# Patient Record
Sex: FEMALE | Race: WHITE | HISPANIC OR LATINO | Employment: FULL TIME | URBAN - METROPOLITAN AREA
[De-identification: names, ages, dates, MRNs, and addresses within clinical notes are randomized per-mention and may not be internally consistent; named-entity substitution may affect disease eponyms.]

---

## 2017-11-27 DIAGNOSIS — Z01.812 PRE-OPERATIVE LABORATORY EXAMINATION: ICD-10-CM

## 2017-11-27 LAB
ANION GAP SERPL CALC-SCNC: 7 MMOL/L (ref 0–11.9)
B-HCG SERPL-ACNC: <0.6 MIU/ML (ref 0–5)
BASOPHILS # BLD AUTO: 0.7 % (ref 0–1.8)
BASOPHILS # BLD: 0.04 K/UL (ref 0–0.12)
BUN SERPL-MCNC: 11 MG/DL (ref 8–22)
CALCIUM SERPL-MCNC: 8.8 MG/DL (ref 8.5–10.5)
CHLORIDE SERPL-SCNC: 105 MMOL/L (ref 96–112)
CO2 SERPL-SCNC: 25 MMOL/L (ref 20–33)
CREAT SERPL-MCNC: 0.62 MG/DL (ref 0.5–1.4)
EOSINOPHIL # BLD AUTO: 0.04 K/UL (ref 0–0.51)
EOSINOPHIL NFR BLD: 0.7 % (ref 0–6.9)
ERYTHROCYTE [DISTWIDTH] IN BLOOD BY AUTOMATED COUNT: 47.9 FL (ref 35.9–50)
GFR SERPL CREATININE-BSD FRML MDRD: >60 ML/MIN/1.73 M 2
GLUCOSE SERPL-MCNC: 90 MG/DL (ref 65–99)
HCT VFR BLD AUTO: 41.7 % (ref 37–47)
HGB BLD-MCNC: 13.9 G/DL (ref 12–16)
IMM GRANULOCYTES # BLD AUTO: 0.01 K/UL (ref 0–0.11)
IMM GRANULOCYTES NFR BLD AUTO: 0.2 % (ref 0–0.9)
LYMPHOCYTES # BLD AUTO: 2.59 K/UL (ref 1–4.8)
LYMPHOCYTES NFR BLD: 43.8 % (ref 22–41)
MCH RBC QN AUTO: 31.3 PG (ref 27–33)
MCHC RBC AUTO-ENTMCNC: 33.3 G/DL (ref 33.6–35)
MCV RBC AUTO: 93.9 FL (ref 81.4–97.8)
MONOCYTES # BLD AUTO: 0.43 K/UL (ref 0–0.85)
MONOCYTES NFR BLD AUTO: 7.3 % (ref 0–13.4)
NEUTROPHILS # BLD AUTO: 2.81 K/UL (ref 2–7.15)
NEUTROPHILS NFR BLD: 47.3 % (ref 44–72)
NRBC # BLD AUTO: 0 K/UL
NRBC BLD AUTO-RTO: 0 /100 WBC
PLATELET # BLD AUTO: 281 K/UL (ref 164–446)
PMV BLD AUTO: 10.3 FL (ref 9–12.9)
POTASSIUM SERPL-SCNC: 3.5 MMOL/L (ref 3.6–5.5)
RBC # BLD AUTO: 4.44 M/UL (ref 4.2–5.4)
SODIUM SERPL-SCNC: 137 MMOL/L (ref 135–145)
WBC # BLD AUTO: 5.9 K/UL (ref 4.8–10.8)

## 2017-11-27 PROCEDURE — 36415 COLL VENOUS BLD VENIPUNCTURE: CPT

## 2017-11-27 PROCEDURE — 85025 COMPLETE CBC W/AUTO DIFF WBC: CPT

## 2017-11-27 PROCEDURE — 84702 CHORIONIC GONADOTROPIN TEST: CPT

## 2017-11-27 PROCEDURE — 80048 BASIC METABOLIC PNL TOTAL CA: CPT

## 2017-12-10 ENCOUNTER — HOSPITAL ENCOUNTER (OUTPATIENT)
Facility: MEDICAL CENTER | Age: 27
End: 2017-12-10
Attending: SPECIALIST | Admitting: SPECIALIST
Payer: MEDICAID

## 2017-12-10 VITALS
HEIGHT: 67 IN | WEIGHT: 174.82 LBS | TEMPERATURE: 98.6 F | OXYGEN SATURATION: 99 % | DIASTOLIC BLOOD PRESSURE: 46 MMHG | HEART RATE: 71 BPM | RESPIRATION RATE: 19 BRPM | SYSTOLIC BLOOD PRESSURE: 98 MMHG | BODY MASS INDEX: 27.44 KG/M2

## 2017-12-10 LAB
HCG UR QL: NEGATIVE
SP GR UR REFRACTOMETRY: 1.03

## 2017-12-10 PROCEDURE — 160048 HCHG OR STATISTICAL LEVEL 1-5: Performed by: SPECIALIST

## 2017-12-10 PROCEDURE — A9270 NON-COVERED ITEM OR SERVICE: HCPCS

## 2017-12-10 PROCEDURE — 160046 HCHG PACU - 1ST 60 MINS PHASE II: Performed by: SPECIALIST

## 2017-12-10 PROCEDURE — 500854 HCHG NEEDLE, INSUFFLATION FOR STEP: Performed by: SPECIALIST

## 2017-12-10 PROCEDURE — 160029 HCHG SURGERY MINUTES - 1ST 30 MINS LEVEL 4: Performed by: SPECIALIST

## 2017-12-10 PROCEDURE — A9270 NON-COVERED ITEM OR SERVICE: HCPCS | Performed by: SPECIALIST

## 2017-12-10 PROCEDURE — 160025 RECOVERY II MINUTES (STATS): Performed by: SPECIALIST

## 2017-12-10 PROCEDURE — 700111 HCHG RX REV CODE 636 W/ 250 OVERRIDE (IP)

## 2017-12-10 PROCEDURE — 700102 HCHG RX REV CODE 250 W/ 637 OVERRIDE(OP)

## 2017-12-10 PROCEDURE — 160041 HCHG SURGERY MINUTES - EA ADDL 1 MIN LEVEL 4: Performed by: SPECIALIST

## 2017-12-10 PROCEDURE — 160035 HCHG PACU - 1ST 60 MINS PHASE I: Performed by: SPECIALIST

## 2017-12-10 PROCEDURE — 160002 HCHG RECOVERY MINUTES (STAT): Performed by: SPECIALIST

## 2017-12-10 PROCEDURE — 160009 HCHG ANES TIME/MIN: Performed by: SPECIALIST

## 2017-12-10 PROCEDURE — 81025 URINE PREGNANCY TEST: CPT

## 2017-12-10 PROCEDURE — 501838 HCHG SUTURE GENERAL: Performed by: SPECIALIST

## 2017-12-10 PROCEDURE — 501579 HCHG TROCAR, STEP 5MM: Performed by: SPECIALIST

## 2017-12-10 PROCEDURE — 500577 HCHG FORCEP, BIPO CUT (EVEREST): Performed by: SPECIALIST

## 2017-12-10 PROCEDURE — 500886 HCHG PACK, LAPAROSCOPY: Performed by: SPECIALIST

## 2017-12-10 PROCEDURE — 700102 HCHG RX REV CODE 250 W/ 637 OVERRIDE(OP): Performed by: SPECIALIST

## 2017-12-10 PROCEDURE — 501399 HCHG SPECIMAN BAG, ENDO CATC: Performed by: SPECIALIST

## 2017-12-10 PROCEDURE — 160047 HCHG PACU  - EA ADDL 30 MINS PHASE II: Performed by: SPECIALIST

## 2017-12-10 RX ORDER — ONDANSETRON 2 MG/ML
4 INJECTION INTRAMUSCULAR; INTRAVENOUS EVERY 6 HOURS PRN
Status: DISCONTINUED | OUTPATIENT
Start: 2017-12-10 | End: 2017-12-10 | Stop reason: HOSPADM

## 2017-12-10 RX ORDER — OXYCODONE HYDROCHLORIDE AND ACETAMINOPHEN 5; 325 MG/1; MG/1
1 TABLET ORAL EVERY 6 HOURS PRN
Qty: 28 TAB | Refills: 0 | Status: SHIPPED | OUTPATIENT
Start: 2017-12-10 | End: 2020-03-25

## 2017-12-10 RX ORDER — IBUPROFEN 200 MG
800 TABLET ORAL EVERY 6 HOURS PRN
Status: DISCONTINUED | OUTPATIENT
Start: 2017-12-10 | End: 2017-12-10 | Stop reason: HOSPADM

## 2017-12-10 RX ORDER — OXYCODONE HCL 5 MG/5 ML
SOLUTION, ORAL ORAL
Status: COMPLETED
Start: 2017-12-10 | End: 2017-12-10

## 2017-12-10 RX ADMIN — IBUPROFEN 800 MG: 200 TABLET, FILM COATED ORAL at 13:20

## 2017-12-10 RX ADMIN — OXYCODONE HYDROCHLORIDE 10 MG: 5 SOLUTION ORAL at 11:53

## 2017-12-10 ASSESSMENT — PAIN SCALES - GENERAL
PAINLEVEL_OUTOF10: 0

## 2017-12-10 NOTE — DISCHARGE INSTRUCTIONS
ACTIVITY: Rest and take it easy for the first 24 hours.  A responsible adult is recommended to remain with you during that time.  It is normal to feel sleepy.  We encourage you to not do anything that requires balance, judgment or coordination.    MILD FLU-LIKE SYMPTOMS ARE NORMAL. YOU MAY EXPERIENCE GENERALIZED MUSCLE ACHES, THROAT IRRITATION, HEADACHE AND/OR SOME NAUSEA.    FOR 24 HOURS DO NOT:  Drive, operate machinery or run household appliances.  Drink beer or alcoholic beverages.   Make important decisions or sign legal documents.    SPECIAL INSTRUCTIONS: FOLLOW INSTRUCTIONS GIVEN TO YOU BY YOUR PHYSICIAN. SCHEDULE L A FOLLOW UP APPOINTMENT FOR 2 WEEK, IF YOU HAVE NOT ALREADY HAVE AN APPOINTMENT. ADVANCE YOUR DIET SLOWLY AS TOLERATED. DRINK LOTS OF LIQUID TO AVOID DEHYDRATION. AVOID ANY HEAVY PUSHING, PULLING OR LIFTING UNTIL CLEARED BY YOUR PHYSICIAN.     DIET: To avoid nausea, slowly advance diet as tolerated, avoiding spicy or greasy foods for the first day.  Add more substantial food to your diet according to your physician's instructions.  Babies can be fed formula or breast milk as soon as they are hungry.  INCREASE FLUIDS AND FIBER TO AVOID CONSTIPATION.    SURGICAL DRESSING/BATHING: YOU MAY SHOWER TOMORROW WHEN YOU ARE FEELING MORE STABLE.     FOLLOW-UP APPOINTMENT:  A follow-up appointment should be arranged with your doctor; call to schedule.    You should CALL YOUR PHYSICIAN if you develop:  Fever greater than 101 degrees F.  Pain not relieved by medication, or persistent nausea or vomiting.  Excessive bleeding (blood soaking through dressing) or unexpected drainage from the wound.  Extreme redness or swelling around the incision site, drainage of pus or foul smelling drainage.  Inability to urinate or empty your bladder within 8 hours.  Problems with breathing or chest pain.    You should call 911 if you develop problems with breathing or chest pain.  If you are unable to contact your doctor or  surgical center, you should go to the nearest emergency room or urgent care center.  Physician's telephone #: [phone]    If any questions arise, call your doctor.  If your doctor is not available, please feel free to call the Surgical Center at [phone].  The Center is open Monday through Friday from 7AM to 7PM.  You can also call the HEALTH HOTLINE open 24 hours/day, 7 days/week and speak to a nurse at [phone], or toll free at [phone].    A registered nurse may call you a few days after your surgery to see how you are doing after your procedure.    MEDICATIONS: Resume taking daily medication.  Take prescribed pain medication with food.  If no medication is prescribed, you may take non-aspirin pain medication if needed.  PAIN MEDICATION CAN BE VERY CONSTIPATING.  Take a stool softener or laxative such as senokot, pericolace, or milk of magnesia if needed.    Prescription given FOR PERCOCET .  Last pain medication given at 4062.    If your physician has prescribed pain medication that includes Acetaminophen (Tylenol), do not take additional Acetaminophen (Tylenol) while taking the prescribed medication.    Depression / Suicide Risk    As you are discharged from this [facility] facility, it is important to learn how to keep safe from harming yourself.    Recognize the warning signs:  · Abrupt changes in personality, positive or negative- including increase in energy   · Giving away possessions  · Change in eating patterns- significant weight changes-  positive or negative  · Change in sleeping patterns- unable to sleep or sleeping all the time   · Unwillingness or inability to communicate  · Depression  · Unusual sadness, discouragement and loneliness  · Talk of wanting to die  · Neglect of personal appearance   · Rebelliousness- reckless behavior  · Withdrawal from people/activities they love  · Confusion- inability to concentrate     If you or a loved one observes any of these behaviors or  has concerns about self-harm, here's what you can do:  · Talk about it- your feelings and reasons for harming yourself  · Remove any means that you might use to hurt yourself (examples: pills, rope, extension cords, firearm)  · Get professional help from the community (Mental Health, Substance Abuse, psychological counseling)  · Do not be alone:Call your Safe Contact- someone whom you trust who will be there for you.  · Call your local CRISIS HOTLINE 257-9590 or 895-674-5773  · Call your local Children's Mobile Crisis Response Team [place] (561) 841-0285 or www.[employer]  · Call the toll free National Suicide Prevention Hotlines   · National Suicide Prevention Lifeline 131-402-LERM (2774)  · National Hope Line Network 800-SUICIDE (716-5546)

## 2017-12-10 NOTE — OR SURGEON
Immediate Post OP Note    PreOp Diagnosis:   1.)  Menorrhagia  2.)  Dysmenorrhea  3.)  Malposition of the uterus (bimanual exam reveals that the uterus is retroverted).  4.)  Dyspareunia.  5.)  The patient desires permanent tubal sterilization in the form of a laparoscopic bilateral tubal ligation.    PostOp Diagnosis:   1.)  Menorrhagia  2.)  Dysmenorrhea  3.)  Malposition of the uterus (bimanual exam reveals that the uterus is retroverted).  4.)  Dyspareunia.  5.)  The patient desires permanent tubal sterilization in the form of a laparoscopic bilateral tubal ligation.    Procedure(s):  HYSTEROSCOPY THERMAL ABLATION/ ENDOMETRIAL - Wound Class: Clean Contaminated  PELVISCOPY- DIAGNOSTIC - Wound Class: Clean  TUBAL COAGULATION LAPAROSCOPIC BILATERAL - Wound Class: Clean    Surgeon(s):  Russ Conteh M.D.    Anesthesiologist/Type of Anesthesia:  Anesthesiologist: Aisha Larson M.D./General    Surgical Staff:  Circulator: Skip Solorio R.N.; Shaun Mayorga R.N.  Scrub Person: Parth Augustin    Specimens:  None    Estimated Blood Loss:   Less than 20 cc's     Findings:   The uterus is very retroverted.  At laparoscopy both fallopian tubes are found to be fairly short but otherwise normal. The uterus (other than for being retroverted) is normal.  At hysteroscopy excellent views of the intrauterine cavity are obtained.   During hysteroscopy no evidence of any congenital uterine anomaly is seen and no evidence of any and immature polyp is seen and no evidence of any submucosal fibroid is seen.   Both tubal ostia are seen.   When hysteroscopy is continued following hydrothermal endometrial ablation the entire intrauterine cavity is found to be nicely and thoroughly ablated.      Complications:   None        12/10/2017 11:19 AM Russ Conteh

## 2017-12-10 NOTE — OP REPORT
DATE OF SERVICE:  12/10/2017    PREOPERATIVE DIAGNOSES:  1.  Menorrhagia.  2.  Dysmenorrhea.  3.  Malposition of the uterus (the uterus is retroverted).  4.  Dyspareunia.  5.  The patient desires permanent tubal sterilization in the form of   laparoscopic bilateral tubal ligation.    POSTOPERATIVE DIAGNOSES:  1.  Menorrhagia.  2.  Dysmenorrhea.  3.  Malposition of the uterus (the uterus is retroverted).  4.  Dyspareunia.  5.  The patient desires permanent tubal sterilization in the form of   laparoscopic bilateral tubal ligation.    PROCEDURES:  1.  Laparoscopy with laparoscopic bilateral tubal ligation.  2.  Hysteroscopy with hysteroscopic endometrial ablation (hydrothermal   endometrial ablation).    SURGEON:  Russ Conteh MD    ANESTHESIA:  General endotracheal tube anesthesia.    ANESTHESIOLOGIST:  Aisha Larson MD    FINDINGS:  Speculum exam under anesthesia reveals that there are no vulvar,   vaginal, or cervical lesions and the cervix is well visualized and is large   and multiparous and is oriented in such a way so as to indicate that the   uterus is retroverted.  At laparoscopy, the uterus was noted to be   retroverted.  Both fallopian tubes are short, but otherwise normal.  Serosal   surfaces of the uterus appear to be normal.  At hysteroscopy, excellent views   of the intrauterine cavity were obtained.  Both tubal ostia are seen and at   hysteroscopy, no evidence of any congenital uterine anomaly is seen and no   evidence of endometrial polyps is seen and no evidence of any submucosal   fibroid is seen.  When hysteroscopy is continued following hydrothermal   endometrial ablation, it is noted that the entire intrauterine cavity is   nicely and thoroughly ablated.    SPECIMENS:  None.    COMPLICATIONS:  None.    ESTIMATED BLOOD LOSS:  Less than 20 mL.    DESCRIPTION OF PROCEDURE:  After the appropriate consents have been obtained,   the patient was taken to the operating room and given general  anesthesia.  She   was prepped and draped in the dorsal lithotomy position and the bladder was   emptied with a catheter.  Attention was directed to the abdomen where a small   (approximately 1 cm) horizontal infraumbilical incision was made with a   scalpel.  A Veress needle was advanced through this incision into the   peritoneal cavity and proper placement in the peritoneal cavity was verified   with palmar hanging drop technique.  The peritoneal cavity was then   insufflated with approximately 2-3 liters of carbon dioxide gas.  The Veress   needle was removed and a 5 mm port was placed through the infraumbilical   incision into the peritoneal cavity utilizing the VersaStep trocar system.    The central portion of this port was removed and a 5 mm 30-degree laparoscope   was inserted through the remaining sleeve and proper entry in the peritoneal   cavity was verified visually with laparoscope.  The patient was placed in   Trendelenburg position.  A 5 mm port was placed suprapubically under direct   laparoscopic visualization utilizing the VersaStep trocar system.  The   laparoscope was inserted through the suprapubic port and the gyrus bipolar   cutting forceps were placed through the infraumbilical port and a sponge stick   was placed in the vaginal vault and used to manipulate the uterus.  The right   fallopian tube was clearly identified and grasped with the gyrus bipolar   cutting forceps and followed to its distal fimbriated end and a picture of the   right fallopian tube including its distal fimbriated end was taken.  After   the distal fimbriated end of the right fallopian tube had been clearly   identified, the right fallopian tube was grasped with the gyrus bipolar   cutting forceps in its mid ampullary region and this area of the right   fallopian tube was thoroughly cauterized and thorough cauterization was   assured with the use of the acoustic ammeter.  Adjacent areas of this region   of the right  fallopian tube were also similarly thoroughly cauterized again   with thorough cauterization assured each time with the use of the acoustic   ammeter.  This area of the right fallopian tube, which had been thoroughly   cauterized was then cut with the gyrus bipolar cutting forceps and a picture   of the right fallopian tube was then taken.  Next, attention was directed to   the left fallopian tube.  The left fallopian tube was grasped with the gyrus   bipolar cutting forceps and followed to its distal fimbriated end and a   picture of the left fallopian tube including its distal fimbriated end was   taken.  After the distal fimbriated end of the left fallopian tube was clearly   identified, the left fallopian tube was grasped in its mid ampullary region   with the gyrus bipolar cutting forceps in this area.  The left fallopian tube   was thoroughly cauterized with thorough cauterization assured with the use of   the acoustic ammeter.  Adjacent areas of this region of the left fallopian   tube were also similarly thoroughly cauterized again with thorough   cauterization assured each time with the use of the acoustic ammeter.  This   area of the left fallopian tube, which had been thoroughly cauterized were   then cut with gyrus bipolar cutting forceps.  A picture of the left fallopian   tube was then taken.  The pictures were taken.  Excellent hemostasis was   observed.  The patient was taken out of Trendelenburg position.  The   laparoscope was removed.  Air was allowed to evacuate the peritoneal cavity.    Both ports were removed.  Each skin incision was reapproximated with placement   of simple interrupted buried suture of 4-0 Monocryl placed in the dermis.    The vaginal sponge stick was removed.  With the patient in the dorsal   lithotomy position, a speculum exam was performed that reveals that there were   no vulvar, vaginal, or cervical lesions and the cervix was well visualized   and was large and  multiparous, and the cervix was oriented in such a way so as   to indicate that the uterus was retroverted.  This had been noted at the time   of laparoscopy.  Anterior aspect of the cervix was grasped with a single   tooth tenaculum.  The cervix was dilated with Hanks and then Hegar dilators   and is dilated to a Hegar #8.  The hysteroscope was inserted through the   endocervical canal into the intrauterine cavity and excellent views of the   intrauterine cavity were obtained.  Of note, the hysteroscope was removed and   the endocervical canal was examined and found to be normal.  The hysteroscope   was reinserted through the endocervical canal into the intrauterine cavity.    Once again, the entire intrauterine cavity was nicely visualized and no   evidence of any submucosal fibroids were seen and no evidence of any   endometrial polyps were seen and no evidence of any congenital uterine anomaly   was seen.  After the usual testing was performed and passed and after the   usual warmup period of 90 seconds, endometrial ablation (hydrothermal   endometrial ablation) was continued for 10 minutes.  During hydrothermal   endometrial ablation, the entire intrauterine cavity was found to become   nicely and thoroughly ablated.  After the cool down period of 90 seconds,   hysteroscopy was continued and again the entire intrauterine cavity was found   to be nicely and thoroughly ablated.  Pictures were taken.  The hysteroscope   was removed.  The single tooth tenaculum was removed from the cervix.    Pressure was placed against the cervix with a gauze sponge for a few moments.    The gauze sponge was removed and the cervix was examined and no bleeding was   seen coming from the cervix either from the site of attachment of the single   tooth tenaculum or from the external cervical os.  The speculum was removed.    The procedure was terminated.  Final lap and needle counts reported to be   correct at the end of the  procedure.  The patient tolerated the procedure well   and sent to postanesthesia recovery in stable condition.       ____________________________________     SHILOH FRANK MD    MED / [facility]    DD:  12/10/2017 11:35:57  DT:  12/10/2017 13:04:09    D#:  [id number]  Job#:  [id number]    cc: Linette Mccord MS3 , Aisha Larson MD, _____ _____

## 2017-12-10 NOTE — H&P
Bari Ortiz          YOB: 1990  Date of today's surgery: Donnell, December 10, 2017  Facility: [facility]    ID: The patient is a very pleasant 27-year-old multipara (para-3 and she has had 3 previous vaginal deliveries).    Chief Complaint: The patient complains of menorrhagia accompanied by dysmenorrhea.    History of Present Illness: The patient came to see me on October 5, 2017 and this was her first visit with me and she presented with multiple different complaints including menorrhagia and dysmenorrhea and dyspareunia.  She told me that the amount of blood flow that she experiences with her menses is (that she stated) “overwhelming” and she described bleeding through her close onto the sheets when she is sleeping at night and she told me that the duration of her menses is 5 to 7 days and she said that she does have dysmenorrhea and that this dysmenorrhea is tolerable.  She also told me that she has been having complains of dyspareunia for the past 8 months.  She says she expenses dyspareunia with most episodes of coitus.  She also tells me that she desires permanent tubal sterilization in the form of a laparoscopic bilateral tubal ligation.  I talked to her about different options including the option of laparoscopy with laparoscopic bilateral tubal ligation and hysteroscopy with hydrothermal endometrial ablation.  She replied that she wishes for us to proceed with laparoscopy with laparoscopic bilateral tubal ligation and hysteroscopy with hydrothermal endometrial ablation.  I discussed with her in detail and at length what laparoscopy with laparoscopic bilateral tubal ligation and hysteroscopy with hydrothermal endometrial ablation is and what laparoscopy with laparoscopic bilateral tubal ligation and hysteroscopy with hydrothermal endometrial ablation involves, and I discussed with her and explained to her in detail and at length the risks and benefits and  alternatives of laparoscopy with laparoscopic bilateral tubal ligation and hysteroscopy with hydrothermal endometrial ablation.  After our discussions and after answering her questions she told me again that she very much wishes for us to proceed with laparoscopy with laparoscopic bilateral tubal ligation and hysteroscopy with hydrothermal endometrial ablation.    Past Medical History: The patient says that she has a history of anemia and that she has a history of urolithiasis.    Past Surgical History: The patient cholecystectomy in 2010.    Medications: The patient says that currently takes no medications.    Allergies: The patient says that she has no known drug allergies.    Social History: The patient denies smoking.  She says she only really consumes alcoholic beverages.  She denies using any recreational drugs.    Review of Systems  General: The patient denies any fevers, chills, sweats.  Pulmonary: The patient denies any coughing, wheezing, chest pain, shortness of breath.  Cardiovascular: The patient denies any palpitations, dyspnea, chest pain.  Gastrointestinal: The patient denies any nausea, vomiting, diarrhea, constipation, hematochezia, melena, history of hepatitis, history of jaundice.  Genitourinary: The patient complains of menorrhagia and dysmenorrhea and dyspareunia.  Musculoskeletal: The patient denies any arthralgias or myalgias.   Neurological: No headaches or syncope or seizures.     Physical Exam:   Vital Signs: The patient's vital signs are stable and she is afebrile.  General: The patient appears well developed and well nourished and relaxed and alert and comfortable and in no apparent distress.    HEENT :  Normo-cephalic, atraumatic, pupils equal, round, reactive to light and accommodation, extra ocular motions intact, pharynx clear; there is no thyromegaly. There is no cervical lymphadenopathy.  Chest: Heart regular rate and rhythm, with no murmurs or rubs or gallops; the lungs are clear  to auscultation bilaterally.  Abdomen: The abdomen is soft and flat and non-tender and non-distended. There is no hepatomegaly. There is no splenomegaly.   Pelvic: Speculum exam reveals that there are no vulvar or vaginal or cervical lesions and that there is no cervical or vaginal discharge.  The vulva and  mucosa appear well estrogenized.  Also, speculum exam reveals no significant cystocele and does reveal a urethrocele.  Also Speculum exam reveals no significant rectocele.  Bimanual exam reveals that the uterus is retroverted (malposition of the uterus) and reveals no evidence of cervical motion tenderness and no evidence of any tenderness to palpation of the uterine corpus and no evidence of uterine enlargement and no evidence of any adnexal masses or tenderness either on the right or the left.  Extremities: No clubbing or cyanosis or edema.   Neurological: non-focal.     Assessment:   1.)  Menorrhagia  2.)  Dysmenorrhea  3.)  Malposition of the uterus (bimanual exam reveals that the uterus is retroverted).  4.)  Dyspareunia.  5.)  The patient desires permanent tubal sterilization in the form of a laparoscopic bilateral tubal ligation.    Plan:   We will proceed today with laparoscopy with laparoscopic bilateral tubal ligation and hysteroscopy with hydrothermal endometrial ablation.  Please see above.        ________________________  Russ Conteh M.D.

## 2018-12-28 ENCOUNTER — APPOINTMENT (OUTPATIENT)
Dept: RADIOLOGY | Facility: IMAGING CENTER | Age: 28
End: 2018-12-28
Attending: PHYSICIAN ASSISTANT

## 2018-12-28 ENCOUNTER — OFFICE VISIT (OUTPATIENT)
Dept: URGENT CARE | Facility: PHYSICIAN GROUP | Age: 28
End: 2018-12-28

## 2018-12-28 VITALS
HEART RATE: 100 BPM | BODY MASS INDEX: 28.25 KG/M2 | RESPIRATION RATE: 18 BRPM | HEIGHT: 67 IN | TEMPERATURE: 98.3 F | SYSTOLIC BLOOD PRESSURE: 122 MMHG | OXYGEN SATURATION: 98 % | DIASTOLIC BLOOD PRESSURE: 74 MMHG | WEIGHT: 180 LBS

## 2018-12-28 DIAGNOSIS — R07.9 CHEST PAIN, UNSPECIFIED TYPE: ICD-10-CM

## 2018-12-28 DIAGNOSIS — R07.89 CHEST TIGHTNESS: ICD-10-CM

## 2018-12-28 DIAGNOSIS — M43.6 NECK STIFFNESS: ICD-10-CM

## 2018-12-28 PROCEDURE — 99203 OFFICE O/P NEW LOW 30 MIN: CPT | Performed by: PHYSICIAN ASSISTANT

## 2018-12-28 PROCEDURE — 72040 X-RAY EXAM NECK SPINE 2-3 VW: CPT | Mod: 26 | Performed by: PHYSICIAN ASSISTANT

## 2018-12-28 PROCEDURE — 71046 X-RAY EXAM CHEST 2 VIEWS: CPT | Mod: 26 | Performed by: PHYSICIAN ASSISTANT

## 2018-12-28 RX ORDER — METHYLPREDNISOLONE 4 MG/1
4 TABLET ORAL DAILY
Qty: 1 KIT | Refills: 0 | Status: SHIPPED | OUTPATIENT
Start: 2018-12-28 | End: 2020-03-25

## 2018-12-28 RX ORDER — CYCLOBENZAPRINE HCL 10 MG
10 TABLET ORAL 3 TIMES DAILY PRN
Qty: 30 TAB | Refills: 0 | Status: SHIPPED | OUTPATIENT
Start: 2018-12-28 | End: 2020-03-25

## 2018-12-28 ASSESSMENT — ENCOUNTER SYMPTOMS
SYNCOPE: 0
VOMITING: 0
SPUTUM PRODUCTION: 0
SHORTNESS OF BREATH: 0
MUSCULOSKELETAL NEGATIVE: 1
ABDOMINAL PAIN: 0
EXERTIONAL CHEST PRESSURE: 0
DIZZINESS: 0
NAUSEA: 0
FEVER: 0
PALPITATIONS: 0
DIARRHEA: 0
IRREGULAR HEARTBEAT: 0
CHILLS: 0
SORE THROAT: 0

## 2018-12-28 ASSESSMENT — PAIN SCALES - GENERAL: PAINLEVEL: 7=MODERATE-SEVERE PAIN

## 2018-12-29 NOTE — PROGRESS NOTES
Subjective:      Bari Naranjo is a 28 y.o. female who presents with Chest Pain (tightness, became numb from chest to shoulders)        Patient is accompanied by her .     Chest Pain    This is a new problem. The current episode started more than 1 month ago (2 months). The onset quality is sudden. The problem occurs intermittently. The problem has been waxing and waning. The pain is present in the substernal region. The pain is at a severity of 1/10. The quality of the pain is described as numbness and tightness. Pertinent negatives include no abdominal pain, dizziness, exertional chest pressure, fever, irregular heartbeat, nausea, palpitations, shortness of breath, sputum production, syncope or vomiting. The pain is aggravated by nothing. She has tried nothing for the symptoms.   Her past medical history is significant for anxiety/panic attacks.     Patient presents to urgent care reporting a 2 month history of intermittent anterior left sided chest pain, described as sharp pain that resolves quickly. No aggravating or alleviating factors. Today she reports she felt the sensation again, but with associated chest tightness and numbness the anterior chest region, radiating down both arms. She also reports bilateral neck pain and stiffness. No recent trauma, no history of chronic neck pain. PMH is significant of anxiety and depression, currently not taking any medications.     Review of Systems   Constitutional: Negative for chills and fever.   HENT: Negative for congestion, ear pain and sore throat.    Respiratory: Negative for sputum production and shortness of breath.    Cardiovascular: Positive for chest pain. Negative for palpitations and syncope.   Gastrointestinal: Negative for abdominal pain, diarrhea, nausea and vomiting.   Genitourinary: Negative.    Musculoskeletal: Negative.    Skin: Negative for rash.   Neurological: Negative for dizziness.        Objective:     /74   Pulse  "100   Temp 36.8 °C (98.3 °F) (Temporal)   Resp 18   Ht 1.702 m (5' 7\")   Wt 81.6 kg (180 lb)   SpO2 98%   Breastfeeding? No   BMI 28.19 kg/m²      Physical Exam   Constitutional: She is oriented to person, place, and time. She appears well-developed and well-nourished. No distress.   HENT:   Head: Normocephalic and atraumatic.   Eyes: Pupils are equal, round, and reactive to light. Conjunctivae are normal. Right eye exhibits no discharge. Left eye exhibits no discharge.   Neck: Normal range of motion and full passive range of motion without pain. Muscular tenderness present. No spinous process tenderness present. No neck rigidity. Normal range of motion present.       Cardiovascular: Normal rate, regular rhythm and normal heart sounds.    No murmur heard.  Pulmonary/Chest: Effort normal and breath sounds normal. No respiratory distress. She has no wheezes.       Patient describes chest pain localized over left anterior sternal border. No TTP of the area.    Musculoskeletal: Normal range of motion.   Lymphadenopathy:     She has no cervical adenopathy.   Neurological: She is alert and oriented to person, place, and time.   Skin: Skin is warm and dry. She is not diaphoretic.   Psychiatric: She has a normal mood and affect. Her behavior is normal.   Nursing note and vitals reviewed.         PMH:  has a past medical history of Anxiety; Bowel habit changes; Cold; Heart burn; Hemorrhagic disorder (HCC); SUPERVISION OF HIGH-RISK PREGNANCY (2010); and Threatened  labor, antepartum (2010). She also has no past medical history of Psychiatric problem or Sleep apnea.  MEDS:   Current Outpatient Prescriptions:   •  MethylPREDNISolone (MEDROL DOSEPAK) 4 MG Tablet Therapy Pack, Take 1 Tab by mouth every day., Disp: 1 Kit, Rfl: 0  •  cyclobenzaprine (FLEXERIL) 10 MG Tab, Take 1 Tab by mouth 3 times a day as needed., Disp: 30 Tab, Rfl: 0  •  oxycodone-acetaminophen (PERCOCET) 5-325 MG Tab, Take 1 Tab by mouth " every 6 hours as needed., Disp: 28 Tab, Rfl: 0  ALLERGIES:   Allergies   Allergen Reactions   • Bloodless      For Sikh reasons   • Nkda [No Known Drug Allergy]      SURGHX:   Past Surgical History:   Procedure Laterality Date   • HYSTEROSCOPY THERMAL ABLATION  12/10/2017    Procedure: HYSTEROSCOPY THERMAL ABLATION/ ENDOMETRIAL;  Surgeon: Russ Conteh M.D.;  Location: SURGERY [facility];  Service: Gynecology   • PELVISCOPY  12/10/2017    Procedure: PELVISCOPY- DIAGNOSTIC;  Surgeon: Russ Conteh M.D.;  Location: SURGERY [facility];  Service: Gynecology   • TUBAL COAGULATION LAPAROSCOPIC BILATERAL  12/10/2017    Procedure: TUBAL COAGULATION LAPAROSCOPIC BILATERAL;  Surgeon: Russ Conteh M.D.;  Location: SURGERY [facility];  Service: Gynecology   • VT ESWL FOR GALLSTONES  2010    gallstones remove   • OTHER ABDOMINAL SURGERY  2010    kartik     SOCHX:  reports that she has never smoked. She has never used smokeless tobacco. She reports that she does not drink alcohol or use drugs.  FH: family history includes Diabetes in her maternal grandfather, maternal grandmother, paternal grandfather, and paternal grandmother.       Assessment/Plan:     1. Chest tightness  - EKG - Clinic Performed --> normal sinus rhythm, no evidence of acute ischemic changes or arrhythmias   - DX-CHEST-2 VIEWS; Future  Impression       No acute cardiopulmonary abnormality.       - DX-CERVICAL SPINE-2 OR 3 VIEWS; Future  Impression       Negative cervical spine series.     Encouraged to monitor chest pain closely and to follow up with primary care within 30 days for further evaluation and management. ED precautions discussed for any persistent/worsening symptoms. The patient demonstrated a good understanding and agreed with the treatment plan.    2. Neck stiffness  - MethylPREDNISolone (MEDROL DOSEPAK) 4 MG Tablet Therapy Pack; Take 1 Tab by mouth every day.  Dispense: 1 Kit; Refill: 0  - cyclobenzaprine (FLEXERIL)  10 MG Tab; Take 1 Tab by mouth 3 times a day as needed.  Dispense: 30 Tab; Refill: 0   - Will cause sedation, avoid driving, operating heavy machinery, and drinking alcohol    Encouraged icing/heating 2-3 times daily followed by gentle massage and stretching. OTC nsaids and flexeril as needed for symptomatic relief. Call or return to office if symptoms persist or worsen. The patient demonstrated a good understanding and agreed with the treatment plan.

## 2020-01-06 ENCOUNTER — APPOINTMENT (OUTPATIENT)
Dept: RADIOLOGY | Facility: IMAGING CENTER | Age: 30
End: 2020-01-06
Attending: NURSE PRACTITIONER
Payer: COMMERCIAL

## 2020-01-06 ENCOUNTER — OCCUPATIONAL MEDICINE (OUTPATIENT)
Dept: URGENT CARE | Facility: PHYSICIAN GROUP | Age: 30
End: 2020-01-06
Payer: COMMERCIAL

## 2020-01-06 ENCOUNTER — APPOINTMENT (OUTPATIENT)
Dept: RADIOLOGY | Facility: IMAGING CENTER | Age: 30
End: 2020-01-06
Attending: NURSE PRACTITIONER

## 2020-01-06 VITALS
BODY MASS INDEX: 26.68 KG/M2 | OXYGEN SATURATION: 98 % | RESPIRATION RATE: 16 BRPM | DIASTOLIC BLOOD PRESSURE: 64 MMHG | HEIGHT: 67 IN | WEIGHT: 170 LBS | SYSTOLIC BLOOD PRESSURE: 114 MMHG | HEART RATE: 72 BPM | TEMPERATURE: 99.3 F

## 2020-01-06 DIAGNOSIS — Z02.89 ENCOUNTER FOR OCCUPATIONAL HEALTH ASSESSMENT: ICD-10-CM

## 2020-01-06 DIAGNOSIS — S89.92XA LEFT KNEE INJURY, INITIAL ENCOUNTER: ICD-10-CM

## 2020-01-06 DIAGNOSIS — S86.912A KNEE STRAIN, LEFT, INITIAL ENCOUNTER: ICD-10-CM

## 2020-01-06 DIAGNOSIS — Z02.83 ENCOUNTER FOR DRUG SCREENING: ICD-10-CM

## 2020-01-06 LAB
AMP AMPHETAMINE: NORMAL
BREATH ALCOHOL COMMENT: NORMAL
COC COCAINE: NORMAL
INT CON NEG: NORMAL
INT CON POS: NORMAL
MET METHAMPHETAMINES: NORMAL
OPI OPIATES: NORMAL
PCP PHENCYCLIDINE: NORMAL
POC BREATHALIZER: 0 PERCENT (ref 0–0.01)
POC DRUG COMMENT 753798-OCCUPATIONAL HEALTH: NORMAL
THC: NORMAL

## 2020-01-06 PROCEDURE — 82075 ASSAY OF BREATH ETHANOL: CPT | Performed by: NURSE PRACTITIONER

## 2020-01-06 PROCEDURE — 80305 DRUG TEST PRSMV DIR OPT OBS: CPT | Performed by: NURSE PRACTITIONER

## 2020-01-06 PROCEDURE — 73562 X-RAY EXAM OF KNEE 3: CPT | Mod: TC,LT,29 | Performed by: NURSE PRACTITIONER

## 2020-01-06 PROCEDURE — 99203 OFFICE O/P NEW LOW 30 MIN: CPT | Mod: 29 | Performed by: NURSE PRACTITIONER

## 2020-01-06 RX ORDER — IBUPROFEN 800 MG/1
800 TABLET ORAL EVERY 8 HOURS PRN
Qty: 30 TAB | Refills: 0 | Status: SHIPPED | OUTPATIENT
Start: 2020-01-06 | End: 2020-03-25

## 2020-01-06 RX ORDER — SUMATRIPTAN 50 MG/1
TABLET, FILM COATED ORAL
COMMUNITY
Start: 2018-02-15

## 2020-01-06 ASSESSMENT — ENCOUNTER SYMPTOMS: WEAKNESS: 1

## 2020-01-06 NOTE — PROGRESS NOTES
"Subjective:      Bari Ramirez is a 29 y.o. female who presents with Knee Injury (was bent down cleaning and then got up and heard a pop and could not walk since, happened today, )      DOI: 01/06/2020: Patient was down on her hands and knees cleaning for 20 minutes without padding to her knees. When she went to stand up she felt a pop in the outside of her left knee and hasn't been able to place weight on her left leg due to pain since. She feels unstable. Patient states that she feels as thought her toes are numb and that her left foot is very cold since the incident. She rates her pain at rest at a 6/10 and 10/10 with movement. She has not taken anything for the pain. Patient denies second job.      Knee Injury   This is a new problem. The current episode started today. The problem occurs constantly. The problem has been unchanged. Associated symptoms include weakness. The symptoms are aggravated by bending, standing, twisting and walking. She has tried nothing for the symptoms.       Review of Systems   Musculoskeletal: Positive for joint pain.        Left knee   Neurological: Positive for weakness.     PMH: No pertinent past medical history to this problem  MEDS: Medications were reviewed in Epic  ALLERGIES: Allergies were reviewed in Epic  SOCHX: Works as at [employer]  FH: No pertinent family history to this problem      Objective:     /64 (BP Location: Right arm, Patient Position: Sitting, BP Cuff Size: Adult)   Pulse 72   Temp 37.4 °C (99.3 °F) (Tympanic)   Resp 16   Ht 1.702 m (5' 7\")   Wt 77.1 kg (170 lb)   SpO2 98%   Breastfeeding? No   BMI 26.63 kg/m²      Physical Exam  Vitals signs reviewed.   Constitutional:       Appearance: Normal appearance.   Cardiovascular:      Pulses: Normal pulses.   Musculoskeletal:         General: Tenderness present. No swelling or deformity.      Left knee: She exhibits decreased range of motion. She exhibits no ecchymosis, no deformity, no " erythema, no LCL laxity and no MCL laxity. Tenderness found. Lateral joint line tenderness noted.      Left lower leg: No edema.        Legs:    Skin:     General: Skin is warm.      Capillary Refill: Capillary refill takes less than 2 seconds.   Neurological:      Mental Status: She is alert and oriented to person, place, and time.   Psychiatric:         Mood and Affect: Mood normal.         Behavior: Behavior normal.         Thought Content: Thought content normal.         Judgment: Judgment normal.                 Assessment/Plan:   1. Left knee injury, initial encounter  - DX-KNEE 3 VIEWS LEFT; Future  - REFERRAL TO OCCUPATIONAL MEDICINE    2. Encounter for drug screening    3. Knee strain, left, initial encounter  - REFERRAL TO OCCUPATIONAL MEDICINE  - ibuprofen (MOTRIN) 800 MG Tab; Take 1 Tab by mouth every 8 hours as needed.  Dispense: 30 Tab; Refill: 0      See C4 and D 39    Follow up with Occupational Medicine

## 2020-01-06 NOTE — LETTER
"EMPLOYEE’S CLAIM FOR COMPENSATION/ REPORT OF INITIAL TREATMENT  FORM C-4    EMPLOYEE’S CLAIM - PROVIDE ALL INFORMATION REQUESTED   First Name  Bari Last Name  James Birthdate                    1990                Sex  female Claim Number   Home Address  [address] Age  29 y.o. 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 (if applicable)  Cleaning the walk in    How did this injury or occupational disease occur? (Be specific an answer in detail. Use additional sheet if necessary)  I was cleaning the walk in around the edges and when I got up my knee popped and I wasn't able to walk.   If you believe that you have an occupational disease, when did you first have knowledge of the disability and it relationship to your employment?  N/A Witnesses to the Accident  Cindy Dill      Nature of Injury or Occupational Disease  Strain  Part(s) of Body Injured or Affected  Knee (L), N/A, N/A    I certify that the above is true and correct to the best of my knowledge and that I have provided this information in order to " obtain the benefits of [place]  I hereby authorize any physician, chiropractor, surgeon, practitioner, or other person, any hospital, including [facility] or [facility] hospital, any medical service organization, any insurance company, or other institution or organization to release to each other, any medical or other information, including benefits paid or payable, pertinent to this injury or disease, except information relative to diagnosis, treatment and/or counseling for AIDS, psychological conditions, alcohol or controlled substances, for which I must give specific authorization.  A Photostat of this authorization shall be as valid as the original.     Date   Place   Employee’s Signature   THIS REPORT MUST BE COMPLETED AND MAILED WITHIN 3 WORKING DAYS OF TREATMENT   Place  [facility]  Name of Facility  [place]   Date  1/6/2020 Diagnosis  (S89.92XA) Left knee injury, initial encounter  (Z02.83) Encounter for drug screening  (S86.912A) Knee strain, left, initial encounter Is there evidence the injured employee was under the influence of alcohol and/or another controlled substance at the time of accident?   Hour  1:09 PM Description of Injury or Disease  Diagnoses of Left knee injury, initial encounter, Encounter for drug screening, and Knee strain, left, initial encounter were pertinent to this visit. No   Treatment  Rest, Ice, Elevation, Ibuprofen 800mg three times per day, knee brace and crutches  Have you advised the patient to remain off work five days or more? No   X-Ray Findings  Negative   If Yes   From Date  To Date      From information given by the employee, together with medical evidence, can you directly connect this injury or occupational disease as job incurred?  Yes If No Full Duty No Modified Duty  Yes   Is additional medical care by a physician  "indicated?  Yes If Modified Duty, Specify any Limitations / Restrictions  No standing, sedentary work only, lust keep leg elevated until further evaluation by occupational medicine, cannot lift any weight   Do you know of any previous injury or disease contributing to this condition or occupational disease?                            No   Date  1/6/2020 Print Doctor’s Name MELI Roldan I certify the employer’s copy of  this form was mailed on:   Address  [address] [place] [address]. [address] Insurer’s Use Only     [facility]  [id number]    Provider’s Tax ID Number  [id number] Telephone  Dept: [phone]        e-GENOVEVA Magaña   e-Signature: Dr. Dev Perez,   Medical Director Degree  MD        ORIGINAL-TREATING PHYSICIAN OR CHIROPRACTOR    PAGE 2-INSURER/TPA    PAGE 3-EMPLOYER    PAGE 4-EMPLOYEE             Form C-4 (rev.10/07)              BRIEF DESCRIPTION OF RIGHTS AND BENEFITS  (Pursuant to [place]RS 616C.050)    Notice of Injury or Occupational Disease (Incident Report Form C-1): If an injury or occupational disease (OD) arises out of and in the course of employment, you must provide written notice to your employer as soon as practicable, but no later than 7 days after the accident or OD. Your employer shall maintain a sufficient supply of the required forms.    Claim for Compensation (Form C-4): If medical treatment is sought, the form C-4 is available at the place of initial treatment. A completed \"Claim for Compensation\" (Form C-4) must be filed within 90 days after an accident or OD. The treating physician or chiropractor must, within 3 working days after treatment, complete and mail to the employer, the employer's insurer and third-party , the Claim for Compensation.    Medical Treatment: If you require medical treatment for your on-the-job injury or OD, you may be required to select a physician or chiropractor from a list provided by your workers’ " compensation insurer, if it has contracted with an Organization for Managed Care (MCO) or Preferred Provider Organization (PPO) or providers of health care. If your employer has not entered into a contract with an MCO or PPO, you may select a physician or chiropractor from the Panel of Physicians and Chiropractors. Any medical costs related to your industrial injury or OD will be paid by your insurer.    Temporary Total Disability (TTD): If your doctor has certified that you are unable to work for a period of at least 5 consecutive days, or 5 cumulative days in a 20-day period, or places restrictions on you that your employer does not accommodate, you may be entitled to TTD compensation.    Temporary Partial Disability (TPD): If the wage you receive upon reemployment is less than the compensation for TTD to which you are entitled, the insurer may be required to pay you TPD compensation to make up the difference. TPD can only be paid for a maximum of 24 months.    Permanent Partial Disability (PPD): When your medical condition is stable and there is an indication of a PPD as a result of your injury or OD, within 30 days, your insurer must arrange for an evaluation by a rating physician or chiropractor to determine the degree of your PPD. The amount of your PPD award depends on the date of injury, the results of the PPD evaluation and your age and wage.    Permanent Total Disability (PTD): If you are medically certified by a treating physician or chiropractor as permanently and totally disabled and have been granted a PTD status by your insurer, you are entitled to receive monthly benefits not to exceed 66 2/3% of your average monthly wage. The amount of your PTD payments is subject to reduction if you previously received a PPD award.    Vocational Rehabilitation Services: You may be eligible for vocational rehabilitation services if you are unable to return to the job due to a permanent physical impairment or  permanent restrictions as a result of your injury or occupational disease.    Transportation and Per Geena Reimbursement: You may be eligible for travel expenses and per geena associated with medical treatment.    Reopening: You may be able to reopen your claim if your condition worsens after claim closure.    Appeal Process: If you disagree with a written determination issued by the insurer or the insurer does not respond to your request, you may appeal to the Department of Administration, , by following the instructions contained in your determination letter. You must appeal the determination within 70 days from the date of the determination letter at [address], [address], [place], [place] [address], or [address], [address], [place], [place] [address]. If you disagree with the  decision, you may appeal to the Department of Administration, . You must file your appeal within 30 days from the date of the  decision letter at [address], [address], [place], [place] [address], or [address], [address], [place], [place] [address]. If you disagree with a decision of an , you may file a petition for judicial review with the District Court. You must do so within 30 days of the Appeal Officer’s decision. You may be represented by an  at your own expense or you may contact the [facility] for possible representation.    [place]  for Injured Workers (NAIW): If you disagree with a  decision, you may request that NAIW represent you without charge at an  Hearing. For information regarding denial of benefits, you may contact the [facility] at: [address], [address], [place] [address], [phone], or [address], [place] [address], [phone]    To File a Complaint with the Division: If you wish to file a complaint with the  of the  Division of Industrial Relations (DIR),  please contact the Workers’ Compensation Section, [address], [address], [place], [place] [address], telephone (134) 138-9941, or [address], [address], [place], [place] [address], telephone [phone]) [phone].    For assistance with Workers’ Compensation Issues: You may contact the Office of the [facility] Consumer Health Assistance, [address] [place] [address], [address], [place], [place] [address], Toll Free 1-755.533.7307, Web site: http://[employer].[facility].nv., E-mail [email or web address].                   __________________________________________________________________                                                     _________        Employee Name / Signature                                                                                                                                              Date                                                                                                                                                                                                     D-2 (rev. 06/18)

## 2020-01-06 NOTE — LETTER
[facility]  [address] [place] [address]. [address] [place] [address]  Phone:  [phone] - Fax:  [phone]   [facility] Progress Report and Disability Certification  Date of Service: 1/6/2020  No Show:  No  Date / Time of Next Visit: 1/10/2020 with occupational medicine   Claim Information   Patient Name: Bari Ramirez  Claim Number:     Employer: [employer]  Date of Injury: 1/6/2020     Insurer / TPA: [employer]  ID / SSN:     Occupation: Manager  Diagnosis: Diagnoses of Left knee injury, initial encounter, Encounter for drug screening, and Knee strain, left, initial encounter were pertinent to this visit.    Medical Information   Related to Industrial Injury?      Subjective Complaints:  DOI: 01/06/2020: Patient was down on her hands and knees cleaning for 20 minutes without padding to her knees. When she went to stand up she felt a pop in the outside of her left knee and hasn't been able to place weight on her left leg due to pain since. She feels unstable. Patient states that she feels as thought her toes are numb and that her left foot is very cold since the incident. She rates her pain at rest at a 6/10 and 10/10 with movement. She has not taken anything for the pain. Patient denies second job.    Objective Findings: Physical Exam  Vitals signs reviewed.   Constitutional:       Appearance: Normal appearance.   Cardiovascular:      Pulses: Normal pulses.   Musculoskeletal:         General: Tenderness present. No swelling or deformity.      Left knee: She exhibits decreased range of motion. She exhibits no ecchymosis, no deformity, no erythema, no LCL laxity and no MCL laxity. Tenderness found. Lateral joint line tenderness noted.      Left lower leg: No edema.        Legs: Pain with palpation, very painful ROM in all planes, Patient exhibits pain with dorsiflexion and plantar flexion    Skin:     General: Skin is warm.      Capillary  Refill: Capillary refill takes less than 2 seconds.   Neurological:      Mental Status: She is alert and oriented to person, place, and time.   Psychiatric:         Mood and Affect: Mood normal.         Behavior: Behavior normal.         Thought Content: Thought content normal.         Judgment: Judgment normal.        Pre-Existing Condition(s):     Assessment:   Initial Visit    Status: Additional Care Required  Permanent Disability:     Plan:   Comments:800mg ibuprofen, rest, ice elevation, knee brace and crutches    Diagnostics: X-ray  Comments:Negative knee series    Comments:       Disability Information   Status: Released to Restricted Duty    From:  2020  Through: 1/10/2020 Restrictions are: Temporary   Physical Restrictions   Sitting:    Standin hrs/day Stooping:    Bending:      Squatting:    Walking:    Climbing:    Pushing:      Pulling:    Other:    Reaching Above Shoulder (L):   Reaching Above Shoulder (R):       Reaching Below Shoulder (L):    Reaching Below Shoulder (R):      Not to exceed Weight Limits   Carrying(hrs):   Weight Limit(lb):   Lifting(hrs):   Weight  Limit(lb):     Comments: Sedentary work only keep left leg elevated until follow up appointment with occupational medicine, cannot lift anything    Repetitive Actions   Hands: i.e. Fine Manipulations from Grasping:     Feet: i.e. Operating Foot Controls:     Driving / Operate Machinery:     Physician Name: MELI Roldan Physician Signature: GENOVEVA Paula e-Signature: Dr. Dev Perez, Medical Director   Clinic Name / Location: [address]  [place] [address] Clinic Phone Number: Dept: [phone]   Appointment Time: 12:35 Pm Visit Start Time: 1:09 PM   Check-In Time:  12:53 Pm Visit Discharge Time:  2:35PM   Original-Treating Physician or Chiropractor    Page 2-Insurer/TPA    Page 3-Employer    Page 4-Employee

## 2020-01-13 ENCOUNTER — OCCUPATIONAL MEDICINE (OUTPATIENT)
Dept: OCCUPATIONAL MEDICINE | Facility: CLINIC | Age: 30
End: 2020-01-13
Payer: COMMERCIAL

## 2020-01-13 VITALS
HEIGHT: 67 IN | BODY MASS INDEX: 26.68 KG/M2 | RESPIRATION RATE: 16 BRPM | SYSTOLIC BLOOD PRESSURE: 114 MMHG | HEART RATE: 84 BPM | TEMPERATURE: 97.9 F | DIASTOLIC BLOOD PRESSURE: 74 MMHG | WEIGHT: 170 LBS | OXYGEN SATURATION: 99 %

## 2020-01-13 DIAGNOSIS — S83.92XD SPRAIN OF LEFT KNEE, UNSPECIFIED LIGAMENT, SUBSEQUENT ENCOUNTER: ICD-10-CM

## 2020-01-13 PROCEDURE — 99204 OFFICE O/P NEW MOD 45 MIN: CPT | Performed by: PREVENTIVE MEDICINE

## 2020-01-13 NOTE — PROGRESS NOTES
"Subjective:      Bari Ramirez is a 29 y.o. female who presents with Follow-Up (WC New2u DOI: 01/06/2020 Lt knee, same, rm 17)      DOI: 01/06/2020: 28 yo female presents with left knee injury. Patient was down on her hands and knees cleaning for 20 minutes without padding to her knees. When she went to stand up she felt a pop in the outside of her left knee and hasn't been able to place weight on her left leg due to pain since. She was seen in [facility], given crutches and knee brace.  Patient states overall symptoms are the same.  She states she is only able to put very little weight on her foot at this time and is not able to ambulate without crutches.  Pain is mostly lateral and posterior nature.  She states she has the hinged knee brace on most the time but it seems to make her pain worse.  She is taking OTC Advil as needed for pain.  She states she noticed a little bit of skin discoloration and some bruising but that resolved with leg elevation.  She states her knee feels weak when she tries to put any weight on it.     HPI    ROS  ROS: All systems were reviewed on intake form, form was reviewed and signed. See scanned documents in media. Pertinent positives and negatives included in HPI.    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   HENT:      Head: Normocephalic and atraumatic.      Right Ear: External ear normal.      Left Ear: External ear normal.      Mouth/Throat:      Mouth: Mucous membranes are moist.      " Pharynx: Oropharynx is clear.   Eyes:      Extraocular Movements: Extraocular movements intact.      Conjunctiva/sclera: Conjunctivae normal.   Cardiovascular:      Rate and Rhythm: Normal rate.   Pulmonary:      Effort: Pulmonary effort is normal.   Skin:     General: Skin is warm and dry.   Neurological:      General: No focal deficit present.      Mental Status: She is alert and oriented to person, place, and time.   Psychiatric:         Mood and Affect: Mood normal.         Thought Content: Thought content normal.         Judgment: Judgment normal.         Left Knee: Minimal swelling lateral knee.  Diffuse tenderness over the lateral knee, medial and posterior knee.  Limited range of motion with knee flexion/extension.  Anterior/posterior drawer test negative.  Unable to fully test varus/valgus stress due to pain.  Only able to put minimal weight on left extremity.       Assessment/Plan:       1. Sprain of left knee, unspecified ligament, subsequent encounter  - REFERRAL TO RADIOLOGY  - MR-KNEE-W/O LEFT; Future  - REFERRAL TO PHYSICAL THERAPY Reason for Therapy: Eval/Treat/Report    Given exam findings and continued inability to bear weight referral for MRI left knee  Placed referral to physical therapy  Continue OTC Advil as needed  Gradually wean off crutches as tolerated  Continue hinged knee brace as needed  Restricted duty  Follow-up 1 week

## 2020-01-13 NOTE — LETTER
[address],   [address] [place] [address]  Phone:  [phone] - Fax:  [phone]   Occupational Health [facility] Progress Report and Disability Certification  Date of Service: 1/13/2020   No Show:  No  Date / Time of Next Visit: 1/23/2020 @ 4:15 PM   Claim Information   Patient Name: Bari Ramirez  Claim Number:     Employer: [employer]  Date of Injury: 1/6/2020     Insurer / TPA: [employer]  ID / SSN:     Occupation: Manager  Diagnosis: The encounter diagnosis was Sprain of left knee, unspecified ligament, subsequent encounter.    Medical Information   Related to Industrial Injury? Yes    Subjective Complaints:  DOI: 01/06/2020: 30 yo female presents with left knee injury. Patient was down on her hands and knees cleaning for 20 minutes without padding to her knees. When she went to stand up she felt a pop in the outside of her left knee and hasn't been able to place weight on her left leg due to pain since. She was seen in [facility], given crutches and knee brace.  Patient states overall symptoms are the same.  She states she is only able to put very little weight on her foot at this time and is not able to ambulate without crutches.  Pain is mostly lateral and posterior nature.  She states she has the hinged knee brace on most the time but it seems to make her pain worse.  She is taking OTC Advil as needed for pain.  She states she noticed a little bit of skin discoloration and some bruising but that resolved with leg elevation.  She states her knee feels weak when she tries to put any weight on it.   Objective Findings: Left Knee: Minimal swelling lateral knee.  Diffuse tenderness over the lateral knee, medial and posterior knee.  Limited range of motion with knee flexion/extension.  Anterior/posterior drawer test negative.  Unable to fully test varus/valgus stress due to pain.  Only able to put minimal weight on left extremity.      Pre-Existing Condition(s):     Assessment:   Condition Same    Status: Additional Care Required  Permanent Disability:No    Plan:      Diagnostics:      Comments:  Given exam findings and continued inability to bear weight referral for MRI left knee  Placed referral to physical therapy  Continue OTC Advil as needed  Gradually wean off crutches as tolerated  Continue hinged knee brace as needed  Restricted duty  Follow-up 1 week    Disability Information   Status: Released to Restricted Duty    From:  2020  Through: 2020 Restrictions are: Temporary   Physical Restrictions   Sitting:    Standing:  < or = to 1 hr/day Stooping:    Bending:      Squattin hrs/day Walking:  < or = to 1 hr/day Climbing:    Pushing:      Pulling:    Other:    Reaching Above Shoulder (L):   Reaching Above Shoulder (R):       Reaching Below Shoulder (L):    Reaching Below Shoulder (R):      Not to exceed Weight Limits   Carrying(hrs):   Weight Limit(lb): < or = to 10 pounds Lifting(hrs):   Weight  Limit(lb): < or = to 10 pounds   Comments: Seated work 90% of shift.  Must use crutches at work    Repetitive Actions   Hands: i.e. Fine Manipulations from Grasping:     Feet: i.e. Operating Foot Controls:     Driving / Operate Machinery:     Physician Name: Logan Garcia D.O. Physician Signature: LOGAN Richard D.O. e-Signature: Dr. Dev Perez, Medical Director   Clinic Name / Location: [address],   [address] [place] [address] Clinic Phone Number: Dept: [phone]   Appointment Time: 9:30 Am Visit Start Time: 8:52 AM   Check-In Time:  8:52 Am Visit Discharge Time:  9:42 AM   Original-Treating Physician or Chiropractor    Page 2-Insurer/TPA    Page 3-Employer    Page 4-Employee

## 2020-01-17 ENCOUNTER — HOSPITAL ENCOUNTER (OUTPATIENT)
Dept: RADIOLOGY | Facility: MEDICAL CENTER | Age: 30
End: 2020-01-17
Attending: PREVENTIVE MEDICINE
Payer: COMMERCIAL

## 2020-01-17 DIAGNOSIS — S83.92XD SPRAIN OF LEFT KNEE, UNSPECIFIED LIGAMENT, SUBSEQUENT ENCOUNTER: ICD-10-CM

## 2020-01-17 PROCEDURE — 73721 MRI JNT OF LWR EXTRE W/O DYE: CPT | Mod: LT

## 2020-01-23 ENCOUNTER — OCCUPATIONAL MEDICINE (OUTPATIENT)
Dept: OCCUPATIONAL MEDICINE | Facility: CLINIC | Age: 30
End: 2020-01-23
Payer: COMMERCIAL

## 2020-01-23 VITALS
RESPIRATION RATE: 14 BRPM | BODY MASS INDEX: 26.68 KG/M2 | HEART RATE: 80 BPM | WEIGHT: 170 LBS | SYSTOLIC BLOOD PRESSURE: 114 MMHG | DIASTOLIC BLOOD PRESSURE: 74 MMHG | OXYGEN SATURATION: 98 % | HEIGHT: 67 IN | TEMPERATURE: 98 F

## 2020-01-23 DIAGNOSIS — S83.92XD SPRAIN OF LEFT KNEE, UNSPECIFIED LIGAMENT, SUBSEQUENT ENCOUNTER: ICD-10-CM

## 2020-01-23 PROCEDURE — 99213 OFFICE O/P EST LOW 20 MIN: CPT | Performed by: PREVENTIVE MEDICINE

## 2020-01-23 ASSESSMENT — ENCOUNTER SYMPTOMS
TINGLING: 0
SENSORY CHANGE: 0

## 2020-01-23 NOTE — LETTER
[address],   [address] [place] [address]  Phone:  [phone] - Fax:  [phone]   [facility] Progress Report and Disability Certification  Date of Service: 1/23/2020   No Show:  No  Date / Time of Next Visit: 2/18/2020 @ 10 am   Claim Information   Patient Name: Bari Ramirez  Claim Number:     Employer: [employer]  Date of Injury: 1/6/2020     Insurer / TPA: [employer]  ID / SSN:     Occupation: Manager  Diagnosis: The encounter diagnosis was Sprain of left knee, unspecified ligament, subsequent encounter.    Medical Information   Related to Industrial Injury? Yes    Subjective Complaints:  DOI: 01/06/2020: 28 yo female presents with left knee injury. Patient was down on her hands and knees cleaning for 20 minutes without padding to her knees. When she went to stand up she felt a pop in the outside of her left knee and hasn't been able to place weight on her left leg due to pain since.  Patient states she has had a little bit improvement in symptoms but continues significant pain with walking and standing.  Pain most on the lateral and posterior knee.  Still feels like she cannot fully extend her knee.  Able to walk a bit more.   Objective Findings: Left Knee: Minimal swelling lateral knee.  Diffuse tenderness over the lateral knee and posterior knee.  Slightly limited range of motion with knee flexion/extension.  Able to bear some weight on the extremity.    MRI left knee: Intact menisci and ligaments.  Strain of the popliteal and gastrocnemius musculature.  Moderate joint effusion.   Pre-Existing Condition(s):     Assessment:   Condition Same    Status: Additional Care Required  Permanent Disability:No    Plan:      Diagnostics:      Comments:  Begin physical therapy  Continue OTC Advil as needed  Gradually wean off crutches as tolerated  Continue hinged knee brace as needed  Restricted duty  Follow-up 3  weeks    Disability Information   Status: Released to Restricted Duty    From:  2020  Through: 2020 Restrictions are: Temporary   Physical Restrictions   Sitting:    Standing:  < or = to 2 hrs/day Stooping:    Bending:      Squattin hrs/day Walking:  < or = to 1 hr/day Climbin hrs/day Pushing:      Pulling:    Other:    Reaching Above Shoulder (L):   Reaching Above Shoulder (R):       Reaching Below Shoulder (L):    Reaching Below Shoulder (R):      Not to exceed Weight Limits   Carrying(hrs):   Weight Limit(lb): < or = to 10 pounds Lifting(hrs):   Weight  Limit(lb): < or = to 10 pounds   Comments: Seated work 90% of shift.  Must use crutches at work     Repetitive Actions   Hands: i.e. Fine Manipulations from Grasping:     Feet: i.e. Operating Foot Controls:     Driving / Operate Machinery:     Physician Name: Logan Landry D.O. Physician Signature: yuki-SignLOGAN LANDRY D.O. e-Signature: Dr. Dev Perez, Medical Director   Clinic Name / Location: [address],   [address]  [place] [address] Clinic Phone Number: Dept: [phone]   Appointment Time: 4:15 Pm Visit Start Time: 3:58 PM   Check-In Time:  3:51 Pm Visit Discharge Time: 4:17 pm    Original-Treating Physician or Chiropractor    Page 2-Insurer/TPA    Page 3-Employer    Page 4-Employee

## 2020-01-24 NOTE — PROGRESS NOTES
"Subjective:      Bari Ramirez is a 29 y.o. female who presents with Follow-Up (WC New2u DOI: 01/06/2020 Lt knee, same, rm 16)      DOI: 01/06/2020: 28 yo female presents with left knee injury. Patient was down on her hands and knees cleaning for 20 minutes without padding to her knees. When she went to stand up she felt a pop in the outside of her left knee and hasn't been able to place weight on her left leg due to pain since.  Patient states she has had a little bit improvement in symptoms but continues significant pain with walking and standing.  Pain most on the lateral and posterior knee.  Still feels like she cannot fully extend her knee.  Able to walk a bit more.     HPI    Review of Systems   Neurological: Negative for tingling and sensory change.     SOCHX: Works as a  at [employer]  FH: No pertinent family history to this problem.     Objective:     /74   Pulse 80   Temp 36.7 °C (98 °F)   Resp 14   Ht 1.702 m (5' 7\")   Wt 77.1 kg (170 lb)   SpO2 98%   BMI 26.63 kg/m²      Physical Exam  Constitutional:       Appearance: Normal appearance.   Cardiovascular:      Rate and Rhythm: Normal rate.   Pulmonary:      Effort: Pulmonary effort is normal.   Skin:     General: Skin is warm and dry.   Neurological:      General: No focal deficit present.      Mental Status: She is alert and oriented to person, place, and time.   Psychiatric:         Mood and Affect: Mood normal.         Thought Content: Thought content normal.         Judgment: Judgment normal.         Left Knee: Minimal swelling lateral knee.  Diffuse tenderness over the lateral knee and posterior knee.  Slightly limited range of motion with knee flexion/extension.  Able to bear some weight on the extremity.    MRI left knee: Intact menisci and ligaments.  Strain of the popliteal and gastrocnemius musculature.  Moderate joint effusion.       Assessment/Plan:       1. Sprain of left knee, unspecified ligament, " subsequent encounter  Begin physical therapy  Continue OTC Advil as needed  Gradually wean off crutches as tolerated  Continue hinged knee brace as needed  Restricted duty  Follow-up 3 weeks

## 2020-02-18 ENCOUNTER — OCCUPATIONAL MEDICINE (OUTPATIENT)
Dept: OCCUPATIONAL MEDICINE | Facility: CLINIC | Age: 30
End: 2020-02-18
Payer: COMMERCIAL

## 2020-02-18 VITALS
BODY MASS INDEX: 26.68 KG/M2 | SYSTOLIC BLOOD PRESSURE: 112 MMHG | HEART RATE: 74 BPM | DIASTOLIC BLOOD PRESSURE: 72 MMHG | WEIGHT: 170 LBS | TEMPERATURE: 97 F | HEIGHT: 67 IN | OXYGEN SATURATION: 97 %

## 2020-02-18 DIAGNOSIS — S83.92XD SPRAIN OF LEFT KNEE, UNSPECIFIED LIGAMENT, SUBSEQUENT ENCOUNTER: ICD-10-CM

## 2020-02-18 PROCEDURE — 99213 OFFICE O/P EST LOW 20 MIN: CPT | Performed by: PREVENTIVE MEDICINE

## 2020-02-18 ASSESSMENT — ENCOUNTER SYMPTOMS
SENSORY CHANGE: 0
TINGLING: 0

## 2020-02-18 NOTE — PROGRESS NOTES
"Subjective:      Bari Ramirez is a 29 y.o. female who presents with Other (WC DOI: 01/06/2020 Lt knee, same, rm 16)      DOI: 01/06/2020: 28 yo female presents with left knee injury. Patient was down on her hands and knees cleaning for 20 minutes without padding to her knees. When she went to stand up she felt a pop in the outside of her left knee. Patient states that overall symptoms are the same. Continues to have difficulty ambulating for long periods. She uses one crutch for support. Notes instability and a feeling of weakness. Only has one PT visit remaining.     HPI    Review of Systems   Neurological: Negative for tingling and sensory change.     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     Objective:     /72   Pulse 74   Temp 36.1 °C (97 °F)   Ht 1.702 m (5' 7\")   Wt 77.1 kg (170 lb)   SpO2 97%   BMI 26.63 kg/m²      Physical Exam    Left Knee: Minimal swelling lateral knee.  Diffuse tenderness over the lateral knee and posterior knee.  Slightly limited range of motion with knee flexion/extension.  Antalgic gait.     MRI left knee: Intact menisci and ligaments.  Strain of the popliteal and gastrocnemius musculature.  Moderate joint effusion.       Assessment/Plan:       1. Sprain of left knee, unspecified ligament, subsequent encounter  Continue physical therapy, placed referral for more visits  Continue OTC Advil as needed  Gradually wean off crutches as tolerated  Continue hinged knee brace as needed  Restricted duty  Follow-up 3 weeks    "

## 2020-02-18 NOTE — LETTER
[address],   [address] [place] [address]  Phone:  [phone] - Fax:  [phone]   Occupational Health [facility] Progress Report and Disability Certification  Date of Service: 2/18/2020   No Show:  No  Date / Time of Next Visit: 3/13/2020  @ 10am   Claim Information   Patient Name: Bari Ramirez  Claim Number:     Employer: [employer]  Date of Injury: 1/6/2020     Insurer / TPA: [employer]  ID / SSN:     Occupation: Manager  Diagnosis: The encounter diagnosis was Sprain of left knee, unspecified ligament, subsequent encounter.    Medical Information   Related to Industrial Injury? Yes    Subjective Complaints:  DOI: 01/06/2020: 28 yo female presents with left knee injury. Patient was down on her hands and knees cleaning for 20 minutes without padding to her knees. When she went to stand up she felt a pop in the outside of her left knee. Patient states that overall symptoms are the same. Continues to have difficulty ambulating for long periods. She uses one crutch for support. Notes instability and a feeling of weakness. Only has one PT visit remaining.   Objective Findings: Left Knee: Minimal swelling lateral knee.  Diffuse tenderness over the lateral knee and posterior knee.  Slightly limited range of motion with knee flexion/extension.  Antalgic gait.     MRI left knee: Intact menisci and ligaments.  Strain of the popliteal and gastrocnemius musculature.  Moderate joint effusion.   Pre-Existing Condition(s):     Assessment:   Condition Same    Status: Additional Care Required  Permanent Disability:No    Plan:      Diagnostics:      Comments:  Continue physical therapy, placed referral for more visits  Continue OTC Advil as needed  Gradually wean off crutches as tolerated  Continue hinged knee brace as needed  Restricted duty  Follow-up 3 weeks    Disability Information   Status: Released to Restricted Duty    From:   2/18/2020  Through: 3/13/2020 Restrictions are: Temporary   Physical Restrictions   Sitting:    Standing:  < or = to 2 hrs/day Stooping:    Bending:      Squatting:  < or = to 1 hr/day Walking:  < or = to 1 hr/day Climbing:    Pushing:      Pulling:    Other:    Reaching Above Shoulder (L):   Reaching Above Shoulder (R):       Reaching Below Shoulder (L):    Reaching Below Shoulder (R):      Not to exceed Weight Limits   Carrying(hrs):   Weight Limit(lb): < or = to 10 pounds Lifting(hrs):   Weight  Limit(lb): < or = to 10 pounds   Comments: Seated work 90% of shift.  Must use crutches at work      Repetitive Actions   Hands: i.e. Fine Manipulations from Grasping:     Feet: i.e. Operating Foot Controls:     Driving / Operate Machinery:     Physician Name: Logan Garcia D.O. Physician Signature: LOGAN Richard D.O. e-Signature: Dr. Dev Perez, Medical Director   Clinic Name / Location: [address],   [address] [place] [address] Clinic Phone Number: Dept: [phone]   Appointment Time: 10:00 Am Visit Start Time: 9:40 AM   Check-In Time:  9:35 Am Visit Discharge Time:  10:10am   Original-Treating Physician or Chiropractor    Page 2-Insurer/TPA    Page 3-Employer    Page 4-Employee

## 2020-03-08 ENCOUNTER — TELEPHONE (OUTPATIENT)
Dept: OCCUPATIONAL MEDICINE | Facility: CLINIC | Age: 30
End: 2020-03-08

## 2020-03-25 ENCOUNTER — OCCUPATIONAL MEDICINE (OUTPATIENT)
Dept: URGENT CARE | Facility: CLINIC | Age: 30
End: 2020-03-25
Payer: COMMERCIAL

## 2020-03-25 VITALS
DIASTOLIC BLOOD PRESSURE: 72 MMHG | HEART RATE: 76 BPM | BODY MASS INDEX: 26.68 KG/M2 | TEMPERATURE: 97.7 F | RESPIRATION RATE: 14 BRPM | SYSTOLIC BLOOD PRESSURE: 118 MMHG | OXYGEN SATURATION: 98 % | HEIGHT: 67 IN | WEIGHT: 170 LBS

## 2020-03-25 DIAGNOSIS — S83.92XD SPRAIN OF UNSPECIFIED SITE OF LEFT KNEE, SUBSEQUENT ENCOUNTER: ICD-10-CM

## 2020-03-25 PROCEDURE — 99212 OFFICE O/P EST SF 10 MIN: CPT | Performed by: PREVENTIVE MEDICINE

## 2020-03-25 ASSESSMENT — PAIN SCALES - GENERAL: PAINLEVEL: NO PAIN

## 2020-03-25 NOTE — LETTER
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    Medical Information   Related to Industrial Injury? Yes    Subjective Complaints:  DOI: 01/06/2020: 28 yo female presents with left knee injury. Patient was down on her hands and knees cleaning for 20 minutes without padding to her knees. When she went to stand up she felt a pop in the outside of her left knee.   She is gone through 2 rounds of physical therapy as well as an MRI which showed some nonspecific findings listed below.  She reports that she has 5 visits to physical therapy left and she is able to ambulate in to her activities of daily living for around 2 or 3 hours when she notes her left knee swelling medially and laterally and starts to experience pain.  When she sits, ices, or elevates her knee it returns to normal and she is able to do her regular activities.  She is taking Advil only occasionally and she is no longer using crutches or her walking brace.  She has been having her knee wrapped by physical therapy which seems to help with the swelling.   Objective Findings: She has mild trace joint effusion on the medial aspect but 5 out of 5 strength at the knee.  No bony tenderness to palpation.  Normal gait    MRI left knee: Intact menisci and ligaments.  Strain of the popliteal and gastrocnemius musculature.  Moderate joint effusion.   Pre-Existing Condition(s):     Assessment:   Condition  Improved    Status: Additional Care Required  Permanent Disability:No    Plan: PT    Diagnostics:      Comments:  Patient is to return to modified duty at work with up to 2 hours on her feet at a time alternating with sitting.  She can continue with her 5 remaining physical therapy visits and return to clinic in approximately 3 weeks for reevaluation.     Disability Information   Status: Released to Restricted Duty    From:  3/25/2020  Through: 4/15/2020 Restrictions are: Temporary   Physical Restrictions   Sitting:    Standing:    Stooping:    Bending:      Squatting:    Walking:    Climbing:    Pushing:      Pulling:    Other:    Reaching Above Shoulder (L):   Reaching Above Shoulder (R):       Reaching Below Shoulder (L):    Reaching Below Shoulder (R):      Not to exceed Weight Limits   Carrying(hrs):   Weight Limit(lb): < or = to 10 pounds Lifting(hrs):   Weight  Limit(lb):     Comments: Limit lifting to 10 pounds, alternate sitting and standing and do not stand more than 2 hours consecutively.  May wear brace at work or wrap knee.    Repetitive Actions   Hands: i.e. Fine Manipulations from Grasping:     Feet: i.e. Operating Foot Controls:     Driving / Operate Machinery:     Physician Name: Logan Garcia D.O. Physician Signature: ROBYN Caldwell P.A.-C. e-Signature: Dr. Dev Perez, Medical Director   Clinic Name / Location: [address], [address] [place] [address] Clinic Phone Number: Dept: [phone]   Appointment Time: 11:45 Am Visit Start Time: 11:41 AM   Check-In Time:  11:36 Am Visit Discharge Time:  12:17pm   Original-Treating Physician or Chiropractor    Page 2-Insurer/TPA    Page 3-Employer    Page 4-Employee

## 2020-03-25 NOTE — PROGRESS NOTES
"Subjective:      Bari Ramirez is a 29 y.o. female who presents with Follow-Up (WC DOI: 01/06/2020 Lt knee - better - RM 01)      DOI: 01/06/2020: 30 yo female presents with left knee injury. Patient was down on her hands and knees cleaning for 20 minutes without padding to her knees. When she went to stand up she felt a pop in the outside of her left knee.   She is gone through 2 rounds of physical therapy as well as an MRI which showed some nonspecific findings listed below.  She reports that she has 5 visits to physical therapy left and she is able to ambulate in to her activities of daily living for around 2 or 3 hours when she notes her left knee swelling medially and laterally and starts to experience pain.  When she sits, ices, or elevates her knee it returns to normal and she is able to do her regular activities.  She is taking Advil only occasionally and she is no longer using crutches or her walking brace.  She has been having her knee wrapped by physical therapy which seems to help with the swelling.     HPI    ROS     PMH: No pertinent past medical history to this problem  MEDS: Medications were reviewed in EMR  ALLERGIES: Allergies were reviewed in EMR  SOCHX: Works at [employer]  FH: No pertinent family history to this problem         Objective:     /72   Pulse 76   Temp 36.5 °C (97.7 °F) (Temporal)   Resp 14   Ht 1.702 m (5' 7\")   Wt 77.1 kg (170 lb)   SpO2 98%   BMI 26.63 kg/m²      Physical Exam    She has mild trace joint effusion on the medial aspect but 5 out of 5 strength at the knee.  No bony tenderness to palpation.  Normal gait    MRI left knee: Intact menisci and ligaments.  Strain of the popliteal and gastrocnemius musculature.  Moderate joint effusion.       Assessment/Plan:       1. Sprain of unspecified site of left knee, subsequent encounter  Continue physical therapy for the next 5 remaining visits and return to clinic in 3 weeks, return to work with the limited " duty listed on the D 39 form.

## 2020-04-15 ENCOUNTER — OCCUPATIONAL MEDICINE (OUTPATIENT)
Dept: URGENT CARE | Facility: CLINIC | Age: 30
End: 2020-04-15
Payer: COMMERCIAL

## 2020-04-15 VITALS
BODY MASS INDEX: 26.68 KG/M2 | TEMPERATURE: 99.5 F | HEART RATE: 78 BPM | HEIGHT: 67 IN | DIASTOLIC BLOOD PRESSURE: 80 MMHG | WEIGHT: 170 LBS | OXYGEN SATURATION: 98 % | SYSTOLIC BLOOD PRESSURE: 110 MMHG

## 2020-04-15 DIAGNOSIS — S83.92XD SPRAIN OF UNSPECIFIED SITE OF LEFT KNEE, SUBSEQUENT ENCOUNTER: ICD-10-CM

## 2020-04-15 PROCEDURE — 99213 OFFICE O/P EST LOW 20 MIN: CPT | Performed by: PREVENTIVE MEDICINE

## 2020-04-15 ASSESSMENT — ENCOUNTER SYMPTOMS
TINGLING: 0
SENSORY CHANGE: 0

## 2020-04-15 NOTE — LETTER
[facility]  [address] [facility], [address] [place] [address]  Phone:  [phone] - Fax:  [phone]   [facility] Progress Report and Disability Certification  Date of Service: 4/15/2020   No Show:  No  Date / Time of Next Visit: 5/13/2020 @ 11:45 AM   Claim Information   Patient Name: Bari Ramirez  Claim Number:     Employer: [employer]  Date of Injury: 1/6/2020     Insurer / TPA: [employer]  ID / SSN:     Occupation: Manager  Diagnosis: The encounter diagnosis was Sprain of unspecified site of left knee, subsequent encounter.    Medical Information   Related to Industrial Injury? Yes    Subjective Complaints:  DOI: 01/06/2020: 30 yo female presents with left knee injury. Patient was down on her hands and knees cleaning for 20 minutes without padding to her knees. When she went to stand up she felt a pop in the outside of her left knee.  Patient states overall left knee pain has improved a little bit but continues get swelling.  She has completed 16 sessions of physical therapy but still has significant pain with ambulation.  She is off crutches and off the knee brace at this point.  However is concerned about the prolonged nature of her condition.  She notes continued occasional swelling.   Objective Findings: Left knee: No gross deformity.  Diffuse tenderness posterior knee.  Antalgic gait.     MRI left knee: Intact menisci and ligaments.  Strain of the popliteal and gastrocnemius musculature.  Moderate joint effusion.      Pre-Existing Condition(s):     Assessment:   Condition Same    Status: Additional Care Required  Permanent Disability:No    Plan:      Diagnostics:      Comments:  Given prolonged recovery will refer to orthopedics  Continue physical therapy, placed referral for 6 more visits once weekly  Continue OTC Advil as needed  Restricted duty  Follow-up 3 weeks     Disability Information   Status: Released to Restricted Duty     From:  4/15/2020  Through: 5/13/2020 Restrictions are: Temporary   Physical Restrictions   Sitting:    Standing:    Stooping:    Bending:      Squatting:  < or = to 1 hr/day Walking:    Climbing:    Pushing:      Pulling:    Other:    Reaching Above Shoulder (L):   Reaching Above Shoulder (R):       Reaching Below Shoulder (L):    Reaching Below Shoulder (R):      Not to exceed Weight Limits   Carrying(hrs):   Weight Limit(lb): < or = to 25 pounds Lifting(hrs):   Weight  Limit(lb): < or = to 25 pounds   Comments: Alternate sitting and standing and do not stand more than 2 hours consecutively.      Repetitive Actions   Hands: i.e. Fine Manipulations from Grasping:     Feet: i.e. Operating Foot Controls:     Driving / Operate Machinery:     Physician Name: Logan Garcia D.O. Physician Signature: LOGAN Richard D.O. e-Signature: Dr. Dev Perez, Medical Director   Clinic Name / Location: [address], [address] [address] Clinic Phone Number: Dept: [phone]   Appointment Time: 11:45 Am Visit Start Time: 11:33 AM   Check-In Time:  11:28 Am Visit Discharge Time:  11:55 AM   Original-Treating Physician or Chiropractor    Page 2-Insurer/TPA    Page 3-Employer    Page 4-Employee

## 2020-04-15 NOTE — PROGRESS NOTES
"Subjective:     Bari Ramirez is a 29 y.o. female who presents for Other (DOi 01/06/20 - Lt knee- same )      DOI: 01/06/2020: 30 yo female presents with left knee injury. Patient was down on her hands and knees cleaning for 20 minutes without padding to her knees. When she went to stand up she felt a pop in the outside of her left knee.  Patient states overall left knee pain has improved a little bit but continues get swelling.  She has completed 16 sessions of physical therapy but still has significant pain with ambulation.  She is off crutches and off the knee brace at this point.  However is concerned about the prolonged nature of her condition.  She notes continued occasional swelling.    Review of Systems   Neurological: Negative for tingling and sensory change.       SOCH[employer]: Works as a  at [employer]  FH: No pertinent family history to this problem.       Objective:     /80   Pulse 78   Temp 37.5 °C (99.5 °F)   Ht 1.702 m (5' 7\")   Wt 77.1 kg (170 lb)   SpO2 98%   BMI 26.63 kg/m²     Constitutional: Patient is in no acute distress. Appears well-developed and well-nourished.   Cardiovascular: Normal rate.    Pulmonary/Chest: Effort normal. No respiratory distress.   Neurological: Patient is alert and oriented to person, place, and time.   Skin: Skin is warm and dry.   Psychiatric: Normal mood and affect. Behavior is normal.     Left knee: No gross deformity.  Diffuse tenderness posterior knee.  Antalgic gait.     MRI left knee: Intact menisci and ligaments.  Strain of the popliteal and gastrocnemius musculature.  Moderate joint effusion.       Assessment/Plan:       1. Sprain of unspecified site of left knee, subsequent encounter  - REFERRAL TO ORTHOPEDICS  - REFERRAL TO PHYSICAL THERAPY Reason for Therapy: Eval/Treat/Report    • Given prolonged recovery will refer to orthopedics  • Continue physical therapy, placed referral for 6 more visits once weekly  • Continue OTC Advil " as needed  • Restricted duty  • Follow-up 3 weeks     Differential diagnosis, natural history, supportive care, and indications for immediate follow-up discussed.

## 2020-05-13 ENCOUNTER — OCCUPATIONAL MEDICINE (OUTPATIENT)
Dept: URGENT CARE | Facility: CLINIC | Age: 30
End: 2020-05-13
Payer: COMMERCIAL

## 2020-05-13 VITALS
SYSTOLIC BLOOD PRESSURE: 124 MMHG | BODY MASS INDEX: 26.68 KG/M2 | HEIGHT: 67 IN | RESPIRATION RATE: 16 BRPM | HEART RATE: 79 BPM | OXYGEN SATURATION: 98 % | TEMPERATURE: 98.6 F | DIASTOLIC BLOOD PRESSURE: 80 MMHG | WEIGHT: 170 LBS

## 2020-05-13 DIAGNOSIS — S83.92XD SPRAIN OF UNSPECIFIED SITE OF LEFT KNEE, SUBSEQUENT ENCOUNTER: ICD-10-CM

## 2020-05-13 PROCEDURE — 99212 OFFICE O/P EST SF 10 MIN: CPT | Performed by: PREVENTIVE MEDICINE

## 2020-05-13 ASSESSMENT — PAIN SCALES - GENERAL: PAINLEVEL: NO PAIN

## 2020-05-13 NOTE — LETTER
[facility]  [address] [facility], [address] [place] [address]  Phone:  [phone] - Fax:  [phone]   [facility] Progress Report and Disability Certification  Date of Service: 5/13/2020   No Show:  No  Date / Time of Next Visit:  Release from care   Claim Information   Patient Name: Bari Ramirez  Claim Number:     Employer: [employer]  Date of Injury: 1/6/2020     Insurer / TPA: [employer]  ID / SSN:     Occupation: Manager  Diagnosis: The encounter diagnosis was Sprain of unspecified site of left knee, subsequent encounter.    Medical Information   Related to Industrial Injury? Yes    Subjective Complaints:  DOI: 01/06/2020: 28 yo female presents with left knee injury. Patient was down on her hands and knees cleaning for 20 minutes without padding to her knees. When she went to stand up she felt a pop in the outside of her left knee.  Patient states that overall knee pain is about the same. At rest she has no pain. Pain is worse with any prolonged standing or walking..  She notes also difficulty with squatting.  She states she still has not been to work.  She has been doing little more sore but they have been doing more at physical therapy.  Has an appointment next week with [facility] orthopedics   Objective Findings: Left knee: No gross deformity.  Full range of motion.  Able to squat about prison down with mild discomfort.     MRI left knee: Intact menisci and ligaments.  Strain of the popliteal and gastrocnemius musculature.  Moderate joint effusion.   Pre-Existing Condition(s):     Assessment:   Condition Same    Status: Discharged / Care Transfer  Permanent Disability:No    Plan:      Diagnostics:      Comments:  Keep appoint with orthopedics  Continue physical therapy  Continue OTC Advil as needed  Restricted duty, until cleared by orthopedics   follow-up as needed     Disability Information   Status: Released to Restricted Duty     From:  5/13/2020  Through:   Restrictions are: Temporary   Physical Restrictions   Sitting:    Standing:    Stooping:    Bending:      Squatting:    Walking:    Climbing:    Pushing:      Pulling:    Other:    Reaching Above Shoulder (L):   Reaching Above Shoulder (R):       Reaching Below Shoulder (L):    Reaching Below Shoulder (R):      Not to exceed Weight Limits   Carrying(hrs):   Weight Limit(lb): < or = to 25 pounds Lifting(hrs):   Weight  Limit(lb): < or = to 25 pounds   Comments: Alternate sitting and standing and do not stand more than 2 hours consecutively.      Repetitive Actions   Hands: i.e. Fine Manipulations from Grasping:     Feet: i.e. Operating Foot Controls:     Driving / Operate Machinery:     Physician Name: Logan Garcia D.O. Physician Signature: maidaSignTAYLLOGAN DEGROOT D.O. e-Signature: Dr. Dev Perez, Medical Director   Clinic Name / Location: [address], [address] [place] [address], [place] [address] Clinic Phone Number: Dept: [phone]   Appointment Time: 11:45 Am Visit Start Time: 11:39 AM   Check-In Time:  11:37 Am Visit Discharge Time: 11:53 am    Original-Treating Physician or Chiropractor    Page 2-Insurer/TPA    Page 3-Employer    Page 4-Employee

## 2023-02-28 ENCOUNTER — OFFICE VISIT (OUTPATIENT)
Dept: URGENT CARE | Facility: PHYSICIAN GROUP | Age: 33
End: 2023-02-28
Payer: MEDICAID

## 2023-02-28 VITALS
RESPIRATION RATE: 18 BRPM | DIASTOLIC BLOOD PRESSURE: 70 MMHG | BODY MASS INDEX: 27.78 KG/M2 | HEIGHT: 67 IN | OXYGEN SATURATION: 98 % | SYSTOLIC BLOOD PRESSURE: 118 MMHG | WEIGHT: 177 LBS | TEMPERATURE: 97.2 F | HEART RATE: 110 BPM

## 2023-02-28 DIAGNOSIS — R68.89 FLU-LIKE SYMPTOMS: ICD-10-CM

## 2023-02-28 DIAGNOSIS — J02.0 STREP PHARYNGITIS: ICD-10-CM

## 2023-02-28 LAB
FLUAV RNA SPEC QL NAA+PROBE: NEGATIVE
FLUBV RNA SPEC QL NAA+PROBE: NEGATIVE
RSV RNA SPEC QL NAA+PROBE: NEGATIVE
S PYO DNA SPEC NAA+PROBE: DETECTED
SARS-COV-2 RNA RESP QL NAA+PROBE: NEGATIVE

## 2023-02-28 PROCEDURE — 0241U POCT CEPHEID COV-2, FLU A/B, RSV - PCR: CPT | Performed by: NURSE PRACTITIONER

## 2023-02-28 PROCEDURE — 87651 STREP A DNA AMP PROBE: CPT | Performed by: NURSE PRACTITIONER

## 2023-02-28 PROCEDURE — 99213 OFFICE O/P EST LOW 20 MIN: CPT | Performed by: NURSE PRACTITIONER

## 2023-02-28 RX ORDER — LIDOCAINE HYDROCHLORIDE 20 MG/ML
SOLUTION OROPHARYNGEAL
Qty: 100 ML | Refills: 0 | Status: SHIPPED | OUTPATIENT
Start: 2023-02-28

## 2023-02-28 RX ORDER — PREDNISONE 10 MG/1
20 TABLET ORAL DAILY
Qty: 6 TABLET | Refills: 0 | Status: SHIPPED | OUTPATIENT
Start: 2023-02-28 | End: 2023-03-03

## 2023-02-28 RX ORDER — AMOXICILLIN 500 MG/1
500 CAPSULE ORAL 2 TIMES DAILY
Qty: 20 CAPSULE | Refills: 0 | Status: SHIPPED | OUTPATIENT
Start: 2023-02-28 | End: 2023-03-10

## 2023-02-28 ASSESSMENT — ENCOUNTER SYMPTOMS
FEVER: 1
RESPIRATORY NEGATIVE: 1
GASTROINTESTINAL NEGATIVE: 1
TROUBLE SWALLOWING: 1
DIAPHORESIS: 1
MYALGIAS: 1
CARDIOVASCULAR NEGATIVE: 1
SORE THROAT: 1
HOARSE VOICE: 1
CHILLS: 1
NEUROLOGICAL NEGATIVE: 1
EYES NEGATIVE: 1

## 2023-03-01 NOTE — PROGRESS NOTES
"Subjective:   Bari Ramirez is a 32 y.o. female who presents for Pharyngitis (X 3 days), Chills (X 2 days), Fever, and Body Aches      Pharyngitis   This is a new problem. Episode onset: 3 days. The problem has been gradually worsening. The maximum temperature recorded prior to her arrival was 103 - 104 F. The fever has been present for 1 to 2 days. The pain is moderate. Associated symptoms include a hoarse voice and trouble swallowing. She has tried NSAIDs, gargles and acetaminophen for the symptoms. The treatment provided mild relief.     Review of Systems   Constitutional:  Positive for chills, diaphoresis, fever and malaise/fatigue.   HENT:  Positive for hoarse voice, sore throat and trouble swallowing.    Eyes: Negative.    Respiratory: Negative.     Cardiovascular: Negative.    Gastrointestinal: Negative.    Genitourinary: Negative.    Musculoskeletal:  Positive for myalgias.   Skin: Negative.    Neurological: Negative.      Medications, Allergies, and current problem list reviewed today in Epic.     Objective:     /70   Pulse (!) 110   Temp 36.2 °C (97.2 °F) (Temporal)   Resp 18   Ht 1.702 m (5' 7\")   Wt 80.3 kg (177 lb)   SpO2 98%     Physical Exam  Constitutional:       Appearance: Normal appearance. She is normal weight.   HENT:      Head: Normocephalic and atraumatic.      Right Ear: Tympanic membrane, ear canal and external ear normal.      Left Ear: Tympanic membrane, ear canal and external ear normal.      Mouth/Throat:      Pharynx: Uvula midline. Oropharyngeal exudate and posterior oropharyngeal erythema present.      Tonsils: Tonsillar exudate present. 2+ on the right. 2+ on the left.   Eyes:      Extraocular Movements: Extraocular movements intact.      Conjunctiva/sclera: Conjunctivae normal.      Pupils: Pupils are equal, round, and reactive to light.   Cardiovascular:      Rate and Rhythm: Normal rate and regular rhythm.      Pulses: Normal pulses.   Pulmonary:      Effort: " Pulmonary effort is normal.      Breath sounds: Normal breath sounds.   Musculoskeletal:      Cervical back: Normal range of motion and neck supple. Tenderness present.   Lymphadenopathy:      Cervical: Cervical adenopathy present.   Skin:     General: Skin is warm and dry.      Capillary Refill: Capillary refill takes less than 2 seconds.   Neurological:      General: No focal deficit present.      Mental Status: She is alert.       Lab Results/POC Test Results   Results for orders placed or performed in visit on 02/28/23   POCT CEPHEID GROUP A STREP - PCR   Result Value Ref Range    POC Group A Strep, PCR Detected (A) Not Detected, Invalid   POCT CEPHEID COV-2, FLU A/B, RSV - PCR   Result Value Ref Range    SARS-CoV-2 by PCR Negative Negative, Invalid    Influenza virus A RNA Negative Negative, Invalid    Influenza virus B, PCR Negative Negative, Invalid    RSV, PCR Negative Negative, Invalid           Assessment/Plan:     Diagnosis and associated orders:     1. Strep pharyngitis  POCT CEPHEID GROUP A STREP - PCR    POCT CEPHEID COV-2, FLU A/B, RSV - PCR    amoxicillin (AMOXIL) 500 MG Cap    lidocaine (XYLOCAINE) 2 % Solution    predniSONE (DELTASONE) 10 MG Tab      2. Flu-like symptoms  POCT CEPHEID GROUP A STREP - PCR    POCT CEPHEID COV-2, FLU A/B, RSV - PCR         Comments/MDM:       Rapid POC Strep testing POSITIVE  Management includes completion of antibiotics, new toothbrush after day 3 of antibiotics, discarding/sanitizing any other dental equipment, soft foods, increased fluids, remain home from school for 24 hours.   Management of symptoms is discussed and expected course is outlined.   Patient instructed to return to office in 24-48 hours if not improving  Patient instructed to present to Emergency Room if notes unilateral swelling, muffled voice, difficulty handling secretions  I considered other causes of pharyngitis including Group C, G strep, peritonsillar abscess, rocky's angina, and  retropharyngeal abscess but the patient's reported symptoms and my exam do not support these alternative diagnosis based on information I have available today.  This may change and I encouraged the patient to return to clinic if they are experiencing new symptoms or their symptoms fail to resolve with time as we cannot rule out all pathology from a single Urgent Care visit.            Differential diagnosis, natural history, supportive care, and indications for immediate follow-up discussed.    Advised the patient to follow-up with the primary care physician for recheck, reevaluation, and consideration of further management.    Please note that this dictation was created using voice recognition software. I have made a reasonable attempt to correct obvious errors, but I expect that there are errors of grammar and possibly content that I did not discover before finalizing the note.    This note was electronically signed by ROLAND [place]

## 2025-05-26 ENCOUNTER — APPOINTMENT (OUTPATIENT)
Dept: RADIOLOGY | Facility: MEDICAL CENTER | Age: 35
End: 2025-05-26
Attending: EMERGENCY MEDICINE

## 2025-05-26 ENCOUNTER — HOSPITAL ENCOUNTER (EMERGENCY)
Facility: MEDICAL CENTER | Age: 35
End: 2025-05-26
Attending: EMERGENCY MEDICINE

## 2025-05-26 VITALS
RESPIRATION RATE: 16 BRPM | HEIGHT: 67 IN | TEMPERATURE: 98 F | WEIGHT: 180.78 LBS | HEART RATE: 61 BPM | OXYGEN SATURATION: 98 % | DIASTOLIC BLOOD PRESSURE: 55 MMHG | BODY MASS INDEX: 28.37 KG/M2 | SYSTOLIC BLOOD PRESSURE: 107 MMHG

## 2025-05-26 DIAGNOSIS — M25.572 ACUTE LEFT ANKLE PAIN: Primary | ICD-10-CM

## 2025-05-26 LAB
CRP SERPL HS-MCNC: 5.4 MG/L (ref 0–3)
ERYTHROCYTE [SEDIMENTATION RATE] IN BLOOD BY WESTERGREN METHOD: 28 MM/HOUR (ref 0–25)
URATE SERPL-MCNC: 4.8 MG/DL (ref 1.9–8.2)

## 2025-05-26 PROCEDURE — 86141 C-REACTIVE PROTEIN HS: CPT

## 2025-05-26 PROCEDURE — 73610 X-RAY EXAM OF ANKLE: CPT | Mod: LT

## 2025-05-26 PROCEDURE — 85652 RBC SED RATE AUTOMATED: CPT

## 2025-05-26 PROCEDURE — 84550 ASSAY OF BLOOD/URIC ACID: CPT

## 2025-05-26 PROCEDURE — 99283 EMERGENCY DEPT VISIT LOW MDM: CPT

## 2025-05-26 RX ORDER — METHYLPREDNISOLONE 4 MG/1
TABLET ORAL
Qty: 1 EACH | Refills: 0 | Status: SHIPPED | OUTPATIENT
Start: 2025-05-26

## 2025-05-26 NOTE — DISCHARGE INSTRUCTIONS
You likely have gout or pseudogout.  If uric acid comes back elevated please use a gout diet  Use splint as needed for discomfort and rest and elevate whenever possible  See your doctor in follow-up if no improvement in 1 week

## 2025-05-26 NOTE — ED PROVIDER NOTES
ER Provider Note    Scribed for Ronak Rowland M.d. by Vince Puente. 5/26/2025  8:11 AM    Primary Care Provider: Radha Tristan M.D.    CHIEF COMPLAINT   Chief Complaint   Patient presents with    Ankle Pain     Pt reports left ankle pain that started hurting yesterday in the afternoon. Pt cannot remember any Trauma, pt reports hard to put weight on it.        HPI/ROS  LIMITATION TO HISTORY   Select: : None  OUTSIDE HISTORIAN(S):  None    Bari Ramirez is a 34 y.o. female who presents to the ED complaining of left ankle pain onset 2 days ago. The day before yesterday the patient has a slight pinch in her left ankle. Yesterday, she continued to feel the pain, and was walking fine. That evening, it began swelling and exhibiting increased pain to the point that she could no longer walk or put weight on it. She then elevated her ankle, massaged the area, and applied a compression sock, but is unable to put weight on it. The patient does mention some warmth coming from the area yesterday. She cannot attribute to any particular acute injury.    PAST MEDICAL HISTORY  Past Medical History[1]    SURGICAL HISTORY  Past Surgical History[2]    FAMILY HISTORY  Family History   Problem Relation Age of Onset    Diabetes Maternal Grandmother     Diabetes Maternal Grandfather     Diabetes Paternal Grandmother     Diabetes Paternal Grandfather        SOCIAL HISTORY   reports that she has never smoked. She has never used smokeless tobacco. She reports that she does not drink alcohol and does not use drugs.    CURRENT MEDICATIONS  Previous Medications    LIDOCAINE (XYLOCAINE) 2 % SOLUTION    5mL orally, may be applied directly to surface of ulcers or used as a swish and spit up to three times daily as needed for oral ulcers    SERTRALINE (ZOLOFT) 50 MG TAB    SERTRALINE HCL 50 MG TABS    SUMATRIPTAN (IMITREX) 50 MG TAB    SUMATRIPTAN SUCCINATE 50 MG TABS       ALLERGIES  Bloodless and Nkda [no known drug allergy]    PHYSICAL  "EXAM  BP (!) 117/100   Pulse 72   Temp 36.3 °C (97.4 °F) (Temporal)   Resp 16   Ht 1.702 m (5' 7\")   Wt 82 kg (180 lb 12.4 oz)   SpO2 99%   BMI 28.31 kg/m²   Constitutional: Well developed, Well nourished, No acute distress, Non-toxic appearance.   HENT: Normocephalic, Atraumatic, Bilateral external ears normal, Oropharynx is clear mucous membranes are moist. No oral exudates or nasal discharge.   Eyes: Pupils are equal round and reactive, EOMI, Conjunctiva normal, No discharge.   Neck: Normal range of motion, No tenderness, Supple, No stridor. No meningismus.  Lymphatic: No lymphadenopathy noted.   Cardiovascular: Regular rate and rhythm without murmur rub or gallop.  Thorax & Lungs: Clear breath sounds bilaterally without wheezes, rhonchi or rales. There is no chest wall tenderness.   Abdomen: Soft non-tender non-distended. There is no rebound or guarding. No organomegaly is appreciated. Bowel sounds are normal.  Skin: Normal without rash.   Back: No CVA or spinal tenderness.   Extremities: No warm from the joint of the left ankle. Intact distal pulses, No edema, No tenderness, No cyanosis, No clubbing. Capillary refill is less than 2 seconds.  Musculoskeletal: Good range of motion in all major joints. No tenderness to palpation or major deformities noted.   Neurologic: Alert & oriented x 3, Normal motor function, Normal sensory function, No focal deficits noted. Reflexes are normal.  Psychiatric: Affect normal, Judgment normal, Mood normal. There is no suicidal ideation or patient reported hallucinations.     DIAGNOSTIC STUDIES    EKG/LABS  Labs Reviewed   URIC ACID   CRP HIGH SENSITIVE (CARDIAC)   SED RATE       I have independently interpreted this EKG    RADIOLOGY/PROCEDURES   The attending emergency physician has independently interpreted the diagnostic imaging associated with this visit and am waiting the final reading from the radiologist.   My preliminary interpretation is a follows: Evidence of " fracture or ankle joint effusion    Radiologist interpretation:  DX-ANKLE 3+ VIEWS LEFT   Final Result         1. Normal left ankle.                COURSE & MEDICAL DECISION MAKING     ASSESSMENT, COURSE AND PLAN  Care Narrative: Had a high suspicion for gout versus pseudogout and there is no history of trauma.  She has spontaneous pain and slight swelling but no significant warmth.  She says that she improved since yesterday but still painful to walk.  She is on her feet a lot 6 to 8 hours a day at work and does not feel like she can work today or tomorrow with this level pain    8:20 AM - Discussed plan of care with patient including a uric acid test to assess for gout.    9:15 AM - Discussed discharge instructions and return precautions with the patient and they were cleared for discharge. Patient was given the opportunity to ask any further questions. Patient is comfortable with discharge at this time.      Strategies [facility]  If her uric acid is elevated she is going to go on a purine controlled diet and follow-up with her doctor either way and take Medrol Dosepak as directed for inflammation    DISPOSITION AND DISCUSSIONS  I have discussed management of the patient with the following physicians and BIBI's:  None    Discussion of management with other QHP or appropriate source(s): None     Barriers to care at this time, including but not limited to: Patient does not have established PCP.     The patient will return for new or worsening symptoms and is stable at the time of discharge.    DISPOSITION:  Patient will be discharged home in stable condition.    FOLLOW UP:  No follow-up provider specified.    OUTPATIENT MEDICATIONS:  New Prescriptions    No medications on file         FINAL DIAGNOSIS  1. Acute left ankle pain       Vince LEONG (Scribe), am scribing for, and in the presence of, Ronak Rowland M.D..    Electronically signed by: Vince Bazzi), 5/26/2025    Ronak LEONG,  M.D. personally performed the services described in this documentation, as scribed by Vince Puente in my presence, and it is both accurate and complete.      The note accurately reflects work and decisions made by me.  Ronak Rowland M.D.  2025  9:34 AM         [1]   Past Medical History:  Diagnosis Date    Anxiety     Has been in ER.    Bowel habit changes     constipation    Cold     15 head cold    Heart burn     Hemorrhagic disorder (HCC)     SUPERVISION OF HIGH-RISK PREGNANCY 2010    Threatened  labor, antepartum 2010   [2]   Past Surgical History:  Procedure Laterality Date    HYSTEROSCOPY THERMAL ABLATION  12/10/2017    Procedure: HYSTEROSCOPY THERMAL ABLATION/ ENDOMETRIAL;  Surgeon: Russ Conteh M.D.;  Location: SURGERY [facility];  Service: Gynecology    PELVISCOPY  12/10/2017    Procedure: PELVISCOPY- DIAGNOSTIC;  Surgeon: Russ Conteh M.D.;  Location: [facility];  Service: Gynecology    TUBAL COAGULATION LAPAROSCOPIC BILATERAL  12/10/2017    Procedure: TUBAL COAGULATION LAPAROSCOPIC BILATERAL;  Surgeon: Russ Conteh M.D.;  Location: SURGERY [facility];  Service: Gynecology    VA ESWL FOR GALLSTONES  2010    gallstones remove    OTHER ABDOMINAL SURGERY      kartik

## 2025-05-26 NOTE — ED NOTES
Discharge orders received. Discharge instructions provided by ERP. AVS provided and reviewed with patient. Patient AOx4 and ambulatory with ankle brace, no IV placed during ED visit. Patient discharged to self without incident.

## 2025-05-26 NOTE — Clinical Note
Bari Ramirez was seen and treated in our emergency department on 5/26/2025.  She may return to work on 05/28/2025.  No restrictions on return to work     If you have any questions or concerns, please don't hesitate to call.      Ronak Rowland M.D.

## 2025-05-26 NOTE — ED TRIAGE NOTES
Chief Complaint   Patient presents with    Ankle Pain     Pt reports left ankle pain that started hurting yesterday in the afternoon. Pt cannot remember any Trauma, pt reports hard to put weight on it.      Explained to pt triage process, made pt aware to tell this RN/staff of any changes/concerns, pt verbalized understanding of process and instructions given. Pt to ER lobby.

## 2025-05-26 NOTE — ED NOTES
Ankle air stirrup brace applied to patients left ankle without incident. CMS intact before and after brace application.

## 2025-05-26 NOTE — ED NOTES
Patient ambulated to Ortho 2 without incident. Primary assessment complete. Patient oriented to care area. Chart up for ERP.

## (undated) DEVICE — SET EXTENSION WITH 2 PORTS (48EA/CA) ***PART #2C8610 IS A SUBSTITUTE*****

## (undated) DEVICE — JELLY SURGILUBE STERILE TUBE 4.25 OZ (1/EA)

## (undated) DEVICE — TUBING CLEARLINK DUO-VENT - C-FLO (48EA/CA)

## (undated) DEVICE — TROCAR STEP 5MM - (3/CA)

## (undated) DEVICE — GOWN WARMING STANDARD FLEX - (30/CA)

## (undated) DEVICE — KIT ROOM DECONTAMINATION

## (undated) DEVICE — NEEDLE INSUFFLATION FOR STEP - (12/BX)

## (undated) DEVICE — POUCH FLUID COLLECTION INVISISHIELD - (10/BX)

## (undated) DEVICE — TUBING INSUFFLATION - (10/BX)

## (undated) DEVICE — BRIEF STRETCH MATERNITY M/L - FITS 20-60IN (5EA/BG 20BG/CA)

## (undated) DEVICE — PACK LAPAROSCOPY - (1/CA)

## (undated) DEVICE — SET SUCTION/IRRIGATION WITH DISPOSABLE TIP (6/CA )PART #0250-070-520 IS A SUB

## (undated) DEVICE — TRAY SRGPRP PVP IOD WT PRP - (20/CA)

## (undated) DEVICE — KIT ANESTHESIA W/CIRCUIT & 3/LT BAG W/FILTER (20EA/CA)

## (undated) DEVICE — CANISTER SUCTION 3000ML MECHANICAL FILTER AUTO SHUTOFF MEDI-VAC NONSTERILE LF DISP  (40EA/CA)

## (undated) DEVICE — DRAPE UNDER BUTTOCKS FLUID - (20/CA)

## (undated) DEVICE — SUTURE GENERAL

## (undated) DEVICE — SLEEVE, VASO, THIGH, MED

## (undated) DEVICE — FORCEP BIPO CUTTING (EVEREST) - 5MM (5EA/BX)

## (undated) DEVICE — PROTECTOR ULNA NERVE - (36PR/CA)

## (undated) DEVICE — SUTURE 0 VICRYL PLUS UR-6 - 27 INCH (36/BX)

## (undated) DEVICE — HEAD HOLDER JUNIOR/ADULT

## (undated) DEVICE — SUCTION INSTRUMENT YANKAUER BULBOUS TIP W/O VENT (50EA/CA)

## (undated) DEVICE — PAD SANITARY 11IN MAXI IND WRAPPED  (12EA/PK 24PK/CA)

## (undated) DEVICE — SYRINGE DISP. 12 CC LL - (100/BX)

## (undated) DEVICE — BANDAID X-LARGE 2 X 4 IN LF (50EA/BX)

## (undated) DEVICE — DRAPE MAYO STAND - (30/CA)

## (undated) DEVICE — KIT HTA GENESYS - (5/BX)

## (undated) DEVICE — WATER IRRIG. STER. 1500 ML - (9/CA)

## (undated) DEVICE — SODIUM CHL IRRIGATION 0.9% 1000ML (12EA/CA)

## (undated) DEVICE — SENSOR SPO2 NEO LNCS ADHESIVE (20/BX) SEE USER NOTES

## (undated) DEVICE — GLOVE BIOGEL ECLIPSE PF LATEX SIZE 7.5

## (undated) DEVICE — BANDAID SHEER STRIP 3/4 IN (100EA/BX 12BX/CA)

## (undated) DEVICE — BAG RETRIEVAL 10ML (10EA/BX)

## (undated) DEVICE — ELECTRODE 850 FOAM ADHESIVE - HYDROGEL RADIOTRNSPRNT (50/PK)

## (undated) DEVICE — ELECTRODE DUAL RETURN W/ CORD - (50/PK)

## (undated) DEVICE — MASK ANESTHESIA ADULT  - (100/CA)

## (undated) DEVICE — GLOVE BIOGEL SZ 7.5 SURGICAL PF LTX - (50PR/BX 4BX/CA)

## (undated) DEVICE — SUTURE 4-0 MONOCRYL PLUS PS-2 - 27 INCH (36/BX)

## (undated) DEVICE — LACTATED RINGERS INJ 1000 ML - (14EA/CA 60CA/PF)

## (undated) DEVICE — SET LEADWIRE 5 LEAD BEDSIDE DISPOSABLE ECG (1SET OF 5/EA)

## (undated) DEVICE — NEPTUNE 4 PORT MANIFOLD - (20/PK)

## (undated) DEVICE — SODIUM CHL. IRRIGATION 0.9% 3000ML (4EA/CA 65CA/PF)

## (undated) DEVICE — DETERGENT RENUZYME PLUS 10 OZ PACKET (50/BX)